# Patient Record
Sex: FEMALE | Race: OTHER | NOT HISPANIC OR LATINO
[De-identification: names, ages, dates, MRNs, and addresses within clinical notes are randomized per-mention and may not be internally consistent; named-entity substitution may affect disease eponyms.]

---

## 2018-11-26 ENCOUNTER — TRANSCRIPTION ENCOUNTER (OUTPATIENT)
Age: 23
End: 2018-11-26

## 2019-03-26 ENCOUNTER — EMERGENCY (EMERGENCY)
Facility: HOSPITAL | Age: 24
LOS: 1 days | Discharge: HOME | End: 2019-03-26
Attending: EMERGENCY MEDICINE
Payer: COMMERCIAL

## 2019-03-26 VITALS
RESPIRATION RATE: 20 BRPM | SYSTOLIC BLOOD PRESSURE: 131 MMHG | TEMPERATURE: 98 F | WEIGHT: 162.92 LBS | OXYGEN SATURATION: 98 % | HEART RATE: 87 BPM | DIASTOLIC BLOOD PRESSURE: 81 MMHG | HEIGHT: 66 IN

## 2019-03-26 LAB
ALBUMIN SERPL ELPH-MCNC: 4.2 G/DL — SIGNIFICANT CHANGE UP (ref 3.3–5)
ALP SERPL-CCNC: 54 U/L — SIGNIFICANT CHANGE UP (ref 40–120)
ALT FLD-CCNC: 14 U/L — SIGNIFICANT CHANGE UP (ref 10–45)
ANION GAP SERPL CALC-SCNC: 13 MMOL/L — SIGNIFICANT CHANGE UP (ref 5–17)
ANION GAP SERPL CALC-SCNC: 14 MMOL/L — SIGNIFICANT CHANGE UP (ref 5–17)
APTT BLD: 27.1 SEC — LOW (ref 27.5–36.3)
AST SERPL-CCNC: 27 U/L — SIGNIFICANT CHANGE UP (ref 10–40)
BASOPHILS # BLD AUTO: 0.1 K/UL — SIGNIFICANT CHANGE UP (ref 0–0.2)
BASOPHILS NFR BLD AUTO: 0.3 % — SIGNIFICANT CHANGE UP (ref 0–2)
BILIRUB SERPL-MCNC: 0.4 MG/DL — SIGNIFICANT CHANGE UP (ref 0.2–1.2)
BUN SERPL-MCNC: 10 MG/DL — SIGNIFICANT CHANGE UP (ref 7–23)
BUN SERPL-MCNC: 11 MG/DL — SIGNIFICANT CHANGE UP (ref 7–23)
CALCIUM SERPL-MCNC: 9.8 MG/DL — SIGNIFICANT CHANGE UP (ref 8.4–10.5)
CALCIUM SERPL-MCNC: 9.8 MG/DL — SIGNIFICANT CHANGE UP (ref 8.4–10.5)
CHLORIDE SERPL-SCNC: 105 MMOL/L — SIGNIFICANT CHANGE UP (ref 96–108)
CHLORIDE SERPL-SCNC: 107 MMOL/L — SIGNIFICANT CHANGE UP (ref 96–108)
CO2 SERPL-SCNC: 19 MMOL/L — LOW (ref 22–31)
CO2 SERPL-SCNC: 20 MMOL/L — LOW (ref 22–31)
CREAT SERPL-MCNC: 0.7 MG/DL — SIGNIFICANT CHANGE UP (ref 0.5–1.3)
CREAT SERPL-MCNC: 0.76 MG/DL — SIGNIFICANT CHANGE UP (ref 0.5–1.3)
EOSINOPHIL # BLD AUTO: 0.1 K/UL — SIGNIFICANT CHANGE UP (ref 0–0.5)
EOSINOPHIL NFR BLD AUTO: 0.4 % — SIGNIFICANT CHANGE UP (ref 0–6)
GLUCOSE SERPL-MCNC: 93 MG/DL — SIGNIFICANT CHANGE UP (ref 70–99)
GLUCOSE SERPL-MCNC: 95 MG/DL — SIGNIFICANT CHANGE UP (ref 70–99)
HCT VFR BLD CALC: 40.4 % — SIGNIFICANT CHANGE UP (ref 34.5–45)
HGB BLD-MCNC: 13.9 G/DL — SIGNIFICANT CHANGE UP (ref 11.5–15.5)
INR BLD: 0.97 RATIO — SIGNIFICANT CHANGE UP (ref 0.88–1.16)
LYMPHOCYTES # BLD AUTO: 12.2 % — LOW (ref 13–44)
LYMPHOCYTES # BLD AUTO: 2 K/UL — SIGNIFICANT CHANGE UP (ref 1–3.3)
MCHC RBC-ENTMCNC: 30.4 PG — SIGNIFICANT CHANGE UP (ref 27–34)
MCHC RBC-ENTMCNC: 34.3 GM/DL — SIGNIFICANT CHANGE UP (ref 32–36)
MCV RBC AUTO: 88.7 FL — SIGNIFICANT CHANGE UP (ref 80–100)
MONOCYTES # BLD AUTO: 1.2 K/UL — HIGH (ref 0–0.9)
MONOCYTES NFR BLD AUTO: 7.1 % — SIGNIFICANT CHANGE UP (ref 2–14)
NEUTROPHILS # BLD AUTO: 13 K/UL — HIGH (ref 1.8–7.4)
NEUTROPHILS NFR BLD AUTO: 80 % — HIGH (ref 43–77)
PLATELET # BLD AUTO: 324 K/UL — SIGNIFICANT CHANGE UP (ref 150–400)
POTASSIUM SERPL-MCNC: 4.5 MMOL/L — SIGNIFICANT CHANGE UP (ref 3.5–5.3)
POTASSIUM SERPL-MCNC: 5.6 MMOL/L — HIGH (ref 3.5–5.3)
POTASSIUM SERPL-SCNC: 4.5 MMOL/L — SIGNIFICANT CHANGE UP (ref 3.5–5.3)
POTASSIUM SERPL-SCNC: 5.6 MMOL/L — HIGH (ref 3.5–5.3)
PROT SERPL-MCNC: 7.7 G/DL — SIGNIFICANT CHANGE UP (ref 6–8.3)
PROTHROM AB SERPL-ACNC: 11.2 SEC — SIGNIFICANT CHANGE UP (ref 10–12.9)
RBC # BLD: 4.56 M/UL — SIGNIFICANT CHANGE UP (ref 3.8–5.2)
RBC # FLD: 10.8 % — SIGNIFICANT CHANGE UP (ref 10.3–14.5)
SODIUM SERPL-SCNC: 138 MMOL/L — SIGNIFICANT CHANGE UP (ref 135–145)
SODIUM SERPL-SCNC: 140 MMOL/L — SIGNIFICANT CHANGE UP (ref 135–145)
WBC # BLD: 16.2 K/UL — HIGH (ref 3.8–10.5)
WBC # FLD AUTO: 16.2 K/UL — HIGH (ref 3.8–10.5)

## 2019-03-26 PROCEDURE — 85379 FIBRIN DEGRADATION QUANT: CPT

## 2019-03-26 PROCEDURE — 80053 COMPREHEN METABOLIC PANEL: CPT

## 2019-03-26 PROCEDURE — 85610 PROTHROMBIN TIME: CPT

## 2019-03-26 PROCEDURE — 93971 EXTREMITY STUDY: CPT

## 2019-03-26 PROCEDURE — 85730 THROMBOPLASTIN TIME PARTIAL: CPT

## 2019-03-26 PROCEDURE — 71275 CT ANGIOGRAPHY CHEST: CPT | Mod: 26

## 2019-03-26 PROCEDURE — 71275 CT ANGIOGRAPHY CHEST: CPT

## 2019-03-26 PROCEDURE — 80048 BASIC METABOLIC PNL TOTAL CA: CPT

## 2019-03-26 PROCEDURE — 99284 EMERGENCY DEPT VISIT MOD MDM: CPT | Mod: 25

## 2019-03-26 PROCEDURE — 93005 ELECTROCARDIOGRAM TRACING: CPT | Mod: XU

## 2019-03-26 PROCEDURE — 93010 ELECTROCARDIOGRAM REPORT: CPT

## 2019-03-26 PROCEDURE — 85027 COMPLETE CBC AUTOMATED: CPT

## 2019-03-26 RX ORDER — ACETAMINOPHEN 500 MG
975 TABLET ORAL ONCE
Qty: 0 | Refills: 0 | Status: COMPLETED | OUTPATIENT
Start: 2019-03-26 | End: 2019-03-26

## 2019-03-26 RX ADMIN — Medication 975 MILLIGRAM(S): at 14:31

## 2019-03-26 RX ADMIN — Medication 975 MILLIGRAM(S): at 12:55

## 2019-03-26 NOTE — ED PROVIDER NOTE - NSFOLLOWUPCLINICS_GEN_ALL_ED_FT
[facility] - Primary Care  Primary Care  [address] [place] [address]  Phone: [phone]  Fax:   Follow Up Time:

## 2019-03-26 NOTE — ED PROVIDER NOTE - ATTENDING CONTRIBUTION TO CARE
23F, pmh periodic asthma, presents with sob, bilat leg craps x a few days. intermittent chest discomfort, none at this time. +mild sob. pt seen at urgent care yesterday, given nebulizer with some relief, started on prednisone, given dx of asthma exacerbation. today with continued sob, seen at pcp today, who sent a d-dimer which was positive, referred to ED. Denies f/c, n/v/d, abd pain, or other complaints.    PE: NAD, NCAT, MMM, Trachea midline, Normal conjunctiva, lungs CTAB, S1/S2 RRR, Normal perfusion, 2+ radial pulses bilat, Abdomen Soft, NTND, No rebound/guarding, No LE edema, No calf ttp, No deformity of extremities, No rashes,  No focal motor or sensory deficits.     23F with intermittent chest pain, sob. Most likely is 2/2 asthma exacerbation, however with +D-dimer must consider PE as well, although much lower suspicion. EKG without ischemic changes, very low suspicion acute cardiac etiology. Check CBC eval for anemia, cmp eval for metabolic derangement. Begin with LE duplex US's, send repeat d-dimer. When d-dimer results back will discuss risks/benefits of CTA to eval for PE. Give tylenol. Re-eval. - Glenn Arana MD

## 2019-03-26 NOTE — ED PROVIDER NOTE - NSFOLLOWUPINSTRUCTIONS_ED_ALL_ED_FT
1. You were seen for shortness of breath. A copy of your resulted labs, imaging, and findings have been provided to you.  2. Continue to take your home medications as prescribed.   3. Follow up with your primary care doctor within 48 hours. Please call [phone] to make an appointment or with any questions you may have.  4. Return immediately to the emergency department for new, persistent, or worsening symptoms or signs. Return immediately to the emergency department if you have chest pain, shortness of breath, loss of consciousness, fever, or coughing blood.

## 2019-03-26 NOTE — ED PROVIDER NOTE - CPE EDP PSYCH NORM
Anesthetic History   No history of anesthetic complications            Review of Systems / Medical History  Patient summary reviewed, nursing notes reviewed and pertinent labs reviewed    Pulmonary          Smoker (Quit 35 years)         Neuro/Psych   Within defined limits           Cardiovascular                  Exercise tolerance: >4 METS     GI/Hepatic/Renal     GERD: well controlled          Comments: Possible esoph stricture.  Endo/Other        Arthritis     Other Findings              Physical Exam    Airway  Mallampati: II  TM Distance: 4 - 6 cm  Neck ROM: normal range of motion   Mouth opening: Normal     Cardiovascular    Rhythm: irregular  Rate: abnormal         Dental    Dentition: Full lower dentures and Full upper dentures     Pulmonary  Breath sounds clear to auscultation               Abdominal         Other Findings            Anesthetic Plan    ASA: 2  Anesthesia type: total IV anesthesia          Induction: Intravenous  Anesthetic plan and risks discussed with: Patient normal...

## 2019-03-26 NOTE — ED ADULT NURSE NOTE - OBJECTIVE STATEMENT
23 y f came to the ed for sob. patient states she went to urgent care yesterday for sob and weakness in her legs. states a d-dimer was done and she received a phone call today saying it was elevated and she needed to go to the ed. patient states taking oral contraceptive. states the sob has been there for two weeks. denies any cough although c/o a "tickle" in her throat. denies any fevers, chills. c/o minor chest discomfort. skin is warm and dry.

## 2019-03-26 NOTE — ED PROCEDURE NOTE - PROCEDURE ADDITIONAL DETAILS
POCUS: Emergency Department Focused Ultrasound performed at patient's bedside.  The complete report may be available in PACS, see below for findings.   Patient/Family aware of the following: Have DVT study repeated in 5-7 days for completion of your evaluation, one may either go to a vascular lab arranged by a primary medical doctor or return to the emergency department if one is unable to obtain one.

## 2019-03-26 NOTE — ED PROVIDER NOTE - CLINICAL SUMMARY MEDICAL DECISION MAKING FREE TEXT BOX
22 y/o F w pmhx of asthma, on OCP's, fhx significant for mother on blood thinners and cousin with DVT, pw sob and chest pain, positive d dimer at pmd today, on exam vitals wnl, grossly unremarkable pe, plan: basic labs, ct a, b/l extremity duplex us pending to assess for dvt/pe, will reassess. Tylenol for treatment.

## 2019-03-26 NOTE — ED PROVIDER NOTE - PROGRESS NOTE DETAILS
d-dimer + in ED. duplex US bilat LE neg. discussed risks/benefits of cta chest with patient, she would like to have this performed. - Glenn Arana MD [place] PGY2: CTA shows no PE, b/l US shows no DVT on reassessment pt in NAD RRR CTAB will dc with PMD f/u

## 2019-03-26 NOTE — ED PROVIDER NOTE - OBJECTIVE STATEMENT
24 y/o F w pmhx of periodic asthma p/w SOB since yesterday w b/l cramps which radiate from thighs to lower legs. +intermittent CP,  none currently. +mild SOB currently. +fatigue and weakness.  Pt went to Urgent care yesterday for asthma attack, was given nebulizer (provided mild relief) and prednisone pills, was told they couldn't 'hear lungs well'. Pt went to PCP today, also noted decreased breath sounds, d dimer of 1.05, was advised to come into ED. Denies nvd, urinary sx, or other complaints. No recent travel. No recent URI sx. Pt on Junel BCP. Allergic to Reglan- fevers.  PCP: Kamilah Osborn 24 y/o F w pmhx of periodic asthma p/w SOB since yesterday w b/l cramps which radiate from thighs to lower legs. +intermittent chest discomfort and palpitations, (none currently) +mild SOB currently. +fatigue and weakness.  Pt went to Urgent care yesterday for asthma attack, was given nebulizer (provided mild relief) and prednisone pills, was told they couldn't 'hear lungs well'. Pt went to PCP today, also noted decreased breath sounds, d dimer of 1.05, was advised to come into ED. Pt's mother hx of anticoagulations (unknown why), cousin w hx of DVT. Denies nvd, urinary sx, calf tenderness or swelling, or other complaints. No recent travel. No recent URI sx. Pt on Junel BCP. Allergic to Reglan- fevers.  PCP: Kamilah Osborn

## 2019-03-26 NOTE — ED PROVIDER NOTE - EXTREMITY EXAM
5/5 strength in b/l lower extremities, hip flexors, knee flexors and extensors, plantar and dorsi flexors, hallux flexors and extensors, sensation intact to light touch b/l l3-s1, compartments soft, 2+ dp pulses b/l , no calf ttp or bl lower extremity  swelling

## 2019-05-06 ENCOUNTER — EMERGENCY (EMERGENCY)
Facility: HOSPITAL | Age: 24
LOS: 1 days | Discharge: HOME | End: 2019-05-06
Attending: EMERGENCY MEDICINE
Payer: COMMERCIAL

## 2019-05-06 VITALS
SYSTOLIC BLOOD PRESSURE: 118 MMHG | WEIGHT: 166.01 LBS | TEMPERATURE: 98 F | HEART RATE: 86 BPM | HEIGHT: 66 IN | OXYGEN SATURATION: 98 % | DIASTOLIC BLOOD PRESSURE: 82 MMHG | RESPIRATION RATE: 18 BRPM

## 2019-05-06 PROCEDURE — 99285 EMERGENCY DEPT VISIT HI MDM: CPT | Mod: 25

## 2019-05-07 VITALS
DIASTOLIC BLOOD PRESSURE: 63 MMHG | TEMPERATURE: 98 F | SYSTOLIC BLOOD PRESSURE: 117 MMHG | HEART RATE: 67 BPM | RESPIRATION RATE: 17 BRPM | OXYGEN SATURATION: 100 %

## 2019-05-07 LAB
ALBUMIN SERPL ELPH-MCNC: 4.1 G/DL — SIGNIFICANT CHANGE UP (ref 3.3–5)
ALP SERPL-CCNC: 58 U/L — SIGNIFICANT CHANGE UP (ref 40–120)
ALT FLD-CCNC: 122 U/L — HIGH (ref 10–45)
ANION GAP SERPL CALC-SCNC: 11 MMOL/L — SIGNIFICANT CHANGE UP (ref 5–17)
APPEARANCE UR: CLEAR — SIGNIFICANT CHANGE UP
AST SERPL-CCNC: 104 U/L — HIGH (ref 10–40)
BACTERIA # UR AUTO: NEGATIVE — SIGNIFICANT CHANGE UP
BASOPHILS # BLD AUTO: 0.1 K/UL — SIGNIFICANT CHANGE UP (ref 0–0.2)
BASOPHILS NFR BLD AUTO: 1.5 % — SIGNIFICANT CHANGE UP (ref 0–2)
BILIRUB SERPL-MCNC: 0.3 MG/DL — SIGNIFICANT CHANGE UP (ref 0.2–1.2)
BILIRUB UR-MCNC: NEGATIVE — SIGNIFICANT CHANGE UP
BUN SERPL-MCNC: 16 MG/DL — SIGNIFICANT CHANGE UP (ref 7–23)
CALCIUM SERPL-MCNC: 9.7 MG/DL — SIGNIFICANT CHANGE UP (ref 8.4–10.5)
CHLORIDE SERPL-SCNC: 102 MMOL/L — SIGNIFICANT CHANGE UP (ref 96–108)
CO2 SERPL-SCNC: 25 MMOL/L — SIGNIFICANT CHANGE UP (ref 22–31)
COLOR SPEC: SIGNIFICANT CHANGE UP
CREAT SERPL-MCNC: 0.75 MG/DL — SIGNIFICANT CHANGE UP (ref 0.5–1.3)
DIFF PNL FLD: NEGATIVE — SIGNIFICANT CHANGE UP
EOSINOPHIL # BLD AUTO: 0.3 K/UL — SIGNIFICANT CHANGE UP (ref 0–0.5)
EOSINOPHIL NFR BLD AUTO: 4 % — SIGNIFICANT CHANGE UP (ref 0–6)
EPI CELLS # UR: 2 /HPF — SIGNIFICANT CHANGE UP
GAS PNL BLDV: SIGNIFICANT CHANGE UP
GLUCOSE SERPL-MCNC: 97 MG/DL — SIGNIFICANT CHANGE UP (ref 70–99)
GLUCOSE UR QL: NEGATIVE — SIGNIFICANT CHANGE UP
HCT VFR BLD CALC: 41.1 % — SIGNIFICANT CHANGE UP (ref 34.5–45)
HGB BLD-MCNC: 13.9 G/DL — SIGNIFICANT CHANGE UP (ref 11.5–15.5)
HYALINE CASTS # UR AUTO: 0 /LPF — SIGNIFICANT CHANGE UP (ref 0–2)
KETONES UR-MCNC: NEGATIVE — SIGNIFICANT CHANGE UP
LEUKOCYTE ESTERASE UR-ACNC: NEGATIVE — SIGNIFICANT CHANGE UP
LIDOCAIN IGE QN: 28 U/L — SIGNIFICANT CHANGE UP (ref 7–60)
LYMPHOCYTES # BLD AUTO: 3.9 K/UL — HIGH (ref 1–3.3)
LYMPHOCYTES # BLD AUTO: 46.1 % — HIGH (ref 13–44)
MCHC RBC-ENTMCNC: 30.5 PG — SIGNIFICANT CHANGE UP (ref 27–34)
MCHC RBC-ENTMCNC: 34 GM/DL — SIGNIFICANT CHANGE UP (ref 32–36)
MCV RBC AUTO: 89.8 FL — SIGNIFICANT CHANGE UP (ref 80–100)
MONOCYTES # BLD AUTO: 0.6 K/UL — SIGNIFICANT CHANGE UP (ref 0–0.9)
MONOCYTES NFR BLD AUTO: 7.4 % — SIGNIFICANT CHANGE UP (ref 2–14)
NEUTROPHILS # BLD AUTO: 3.4 K/UL — SIGNIFICANT CHANGE UP (ref 1.8–7.4)
NEUTROPHILS NFR BLD AUTO: 41 % — LOW (ref 43–77)
NITRITE UR-MCNC: NEGATIVE — SIGNIFICANT CHANGE UP
PH UR: 6.5 — SIGNIFICANT CHANGE UP (ref 5–8)
PLATELET # BLD AUTO: 387 K/UL — SIGNIFICANT CHANGE UP (ref 150–400)
POTASSIUM SERPL-MCNC: 4.3 MMOL/L — SIGNIFICANT CHANGE UP (ref 3.5–5.3)
POTASSIUM SERPL-SCNC: 4.3 MMOL/L — SIGNIFICANT CHANGE UP (ref 3.5–5.3)
PROT SERPL-MCNC: 7 G/DL — SIGNIFICANT CHANGE UP (ref 6–8.3)
PROT UR-MCNC: NEGATIVE — SIGNIFICANT CHANGE UP
RBC # BLD: 4.57 M/UL — SIGNIFICANT CHANGE UP (ref 3.8–5.2)
RBC # FLD: 10.5 % — SIGNIFICANT CHANGE UP (ref 10.3–14.5)
RBC CASTS # UR COMP ASSIST: 2 /HPF — SIGNIFICANT CHANGE UP (ref 0–4)
SODIUM SERPL-SCNC: 138 MMOL/L — SIGNIFICANT CHANGE UP (ref 135–145)
SP GR SPEC: 1.02 — SIGNIFICANT CHANGE UP (ref 1.01–1.02)
UROBILINOGEN FLD QL: NEGATIVE — SIGNIFICANT CHANGE UP
WBC # BLD: 8.4 K/UL — SIGNIFICANT CHANGE UP (ref 3.8–10.5)
WBC # FLD AUTO: 8.4 K/UL — SIGNIFICANT CHANGE UP (ref 3.8–10.5)
WBC UR QL: 2 /HPF — SIGNIFICANT CHANGE UP (ref 0–5)

## 2019-05-07 PROCEDURE — 96361 HYDRATE IV INFUSION ADD-ON: CPT

## 2019-05-07 PROCEDURE — 99284 EMERGENCY DEPT VISIT MOD MDM: CPT | Mod: 25

## 2019-05-07 PROCEDURE — 82435 ASSAY OF BLOOD CHLORIDE: CPT

## 2019-05-07 PROCEDURE — 74177 CT ABD & PELVIS W/CONTRAST: CPT | Mod: 26

## 2019-05-07 PROCEDURE — 96375 TX/PRO/DX INJ NEW DRUG ADDON: CPT

## 2019-05-07 PROCEDURE — 82803 BLOOD GASES ANY COMBINATION: CPT

## 2019-05-07 PROCEDURE — 85014 HEMATOCRIT: CPT

## 2019-05-07 PROCEDURE — 82330 ASSAY OF CALCIUM: CPT

## 2019-05-07 PROCEDURE — 81001 URINALYSIS AUTO W/SCOPE: CPT

## 2019-05-07 PROCEDURE — 74177 CT ABD & PELVIS W/CONTRAST: CPT

## 2019-05-07 PROCEDURE — 76705 ECHO EXAM OF ABDOMEN: CPT

## 2019-05-07 PROCEDURE — 83690 ASSAY OF LIPASE: CPT

## 2019-05-07 PROCEDURE — 85027 COMPLETE CBC AUTOMATED: CPT

## 2019-05-07 PROCEDURE — 80053 COMPREHEN METABOLIC PANEL: CPT

## 2019-05-07 PROCEDURE — 83605 ASSAY OF LACTIC ACID: CPT

## 2019-05-07 PROCEDURE — 84295 ASSAY OF SERUM SODIUM: CPT

## 2019-05-07 PROCEDURE — 84132 ASSAY OF SERUM POTASSIUM: CPT

## 2019-05-07 PROCEDURE — 82947 ASSAY GLUCOSE BLOOD QUANT: CPT

## 2019-05-07 PROCEDURE — 96374 THER/PROPH/DIAG INJ IV PUSH: CPT | Mod: XU

## 2019-05-07 RX ORDER — ACETAMINOPHEN 500 MG
650 TABLET ORAL ONCE
Qty: 0 | Refills: 0 | Status: COMPLETED | OUTPATIENT
Start: 2019-05-07 | End: 2019-05-07

## 2019-05-07 RX ORDER — SODIUM CHLORIDE 9 MG/ML
1000 INJECTION INTRAMUSCULAR; INTRAVENOUS; SUBCUTANEOUS ONCE
Qty: 0 | Refills: 0 | Status: COMPLETED | OUTPATIENT
Start: 2019-05-07 | End: 2019-05-07

## 2019-05-07 RX ORDER — SUCRALFATE 1 G
1 TABLET ORAL ONCE
Qty: 0 | Refills: 0 | Status: COMPLETED | OUTPATIENT
Start: 2019-05-07 | End: 2019-05-07

## 2019-05-07 RX ORDER — MORPHINE SULFATE 50 MG/1
2 CAPSULE, EXTENDED RELEASE ORAL ONCE
Qty: 0 | Refills: 0 | Status: DISCONTINUED | OUTPATIENT
Start: 2019-05-07 | End: 2019-05-07

## 2019-05-07 RX ORDER — ONDANSETRON 8 MG/1
4 TABLET, FILM COATED ORAL ONCE
Qty: 0 | Refills: 0 | Status: COMPLETED | OUTPATIENT
Start: 2019-05-07 | End: 2019-05-07

## 2019-05-07 RX ORDER — FAMOTIDINE 10 MG/ML
20 INJECTION INTRAVENOUS ONCE
Qty: 0 | Refills: 0 | Status: COMPLETED | OUTPATIENT
Start: 2019-05-07 | End: 2019-05-07

## 2019-05-07 RX ADMIN — Medication 650 MILLIGRAM(S): at 00:34

## 2019-05-07 RX ADMIN — Medication 30 MILLILITER(S): at 00:35

## 2019-05-07 RX ADMIN — Medication 1 GRAM(S): at 03:20

## 2019-05-07 RX ADMIN — MORPHINE SULFATE 2 MILLIGRAM(S): 50 CAPSULE, EXTENDED RELEASE ORAL at 02:36

## 2019-05-07 RX ADMIN — SODIUM CHLORIDE 1000 MILLILITER(S): 9 INJECTION INTRAMUSCULAR; INTRAVENOUS; SUBCUTANEOUS at 03:10

## 2019-05-07 RX ADMIN — Medication 650 MILLIGRAM(S): at 02:27

## 2019-05-07 RX ADMIN — ONDANSETRON 4 MILLIGRAM(S): 8 TABLET, FILM COATED ORAL at 02:35

## 2019-05-07 RX ADMIN — SODIUM CHLORIDE 1000 MILLILITER(S): 9 INJECTION INTRAMUSCULAR; INTRAVENOUS; SUBCUTANEOUS at 02:32

## 2019-05-07 RX ADMIN — FAMOTIDINE 20 MILLIGRAM(S): 10 INJECTION INTRAVENOUS at 00:47

## 2019-05-07 RX ADMIN — MORPHINE SULFATE 2 MILLIGRAM(S): 50 CAPSULE, EXTENDED RELEASE ORAL at 03:15

## 2019-05-07 NOTE — ED ADULT NURSE NOTE - OBJECTIVE STATEMENT
pt reports cramping across abdomen radiating to flanks bilaterally; reports worse after eating; has occurred intermittently over last weeks but is worse today; reports diarrhea x 1 today; pt is moaning and sitting in fetal position on stretcher

## 2019-05-07 NOTE — ED PROVIDER NOTE - PROGRESS NOTE DETAILS
Rodrigo Davies MD, PGY3: 23F recurrent abdominal pain and bloating monthly, no CT or workup in the past. Diarrhea, generalized abd pain usually postprandial. TTP RUQ, no rebound. US RUQ, if negative, CT a/p. Labs, GI meds. Pregnancy profile Attending note (Levar): US shows normal gallbladder.  Had some initial improvement in pain, now worsening, and dry heaving / nausea; will give morphine 2mg and zofran 4mg iv.  Plan now for CT for further evaluation of abdominal pain. Pt feels better. Tolerating PO. CT negative for acute pathology. Patient informed of ED visit findings, understands plan.  Patient provided with written and further verbal instructions not included in discharge paperwork.  Patient instructed to follow up with their primary care physician in 2-3 days and return for new, worsened, or persistent symptoms. Will provide GI follow up. Pt feels better. Tolerating PO. CT negative for acute pathology. Patient informed of ED visit findings, understands plan.  Patient provided with written and further verbal instructions not included in discharge paperwork.  Patient instructed to follow up with their primary care physician in 2-3 days and return for new, worsened, or persistent symptoms. Will provide GI follow up. Discussed hepatic lesion and outpatient follow up

## 2019-05-07 NOTE — ED PROVIDER NOTE - CLINICAL SUMMARY MEDICAL DECISION MAKING FREE TEXT BOX
23F p/w abd pain, nausea; tender somewhat diffusely in abdomen though more tender in RUQ; will obtain labs, cbc cmp lipase hcg ua and ultrasound; if normal US and no significant findings, consider CT.

## 2019-05-07 NOTE — ED PROVIDER NOTE - NSFOLLOWUPCLINICS_GEN_ALL_ED_FT
[facility] Gastroenterology  Gastroenterology  [address] [place] [id number]  Phone: [phone]  Fax:   Follow Up Time:

## 2019-05-07 NOTE — ED PROVIDER NOTE - NSFOLLOWUPINSTRUCTIONS_ED_ALL_ED_FT
Please follow up with your Primary MD in 24-48 hr.  Seek immediate medical care for any new/worsening signs or symptoms.  Take pepcid 20mg once a day for recurring pain.  Follow up with gastroenterology for an appointment.

## 2019-05-07 NOTE — ED PROVIDER NOTE - OBJECTIVE STATEMENT
Attending note (Levar): 24y/o F h/o asthma, upper abdominal cramping since this afternoon; states whenever she eats she has pain with bloating, gassy feeling and tastes acid feeling.  Has had similar pain before, worse after eating.  +nausea, no vomiting  Pain in mid upper abdomen and radiating around to back (around sides of abdomen)   1 episode loose stools today.    Nonsmoker; +social drinking (none in last 24 h), denies substance abuse (denies marijuana use)  +diarrhea  no fever/chills.  no recent travel    LMP last month, no dysuria, no vaginal discharge  PMH: h/o asthma  meds: inhaler prn (albuterol)  allergy to reglan (fever and high blood pressure?) Attending note (Levar): 22y/o F h/o asthma, upper abdominal cramping since this afternoon; states whenever she eats she has pain with bloating, gassy feeling and tastes acid feeling.  Has had similar pain before, worse after eating.  +nausea, no vomiting  Pain in mid upper abdomen and radiating around to back (around sides of abdomen)   1 episode loose stools today.    Nonsmoker; +social drinking (none in last 24 h), denies substance abuse (denies marijuana use)  +diarrhea  no fever/chills.  no recent travel    LMP last month, no dysuria, no vaginal discharge   PMH: h/o asthma  meds: inhaler prn (albuterol); was on prednisone 1 month ago for exacerbation; no h/o intubations or hospitalizations for asthma.  allergy to reglan (rxn: fever and high blood pressure?)

## 2019-05-07 NOTE — ED PROVIDER NOTE - ATTENDING CONTRIBUTION TO CARE
22y/o F h/o asthma, upper abdominal cramping since this afternoon; states whenever she eats she has pain with bloating, gassy feeling and tastes acid feeling.     On Physical Exam:  General: well appearing, in NAD, speaking clearly in full sentences and without difficulty; cooperative with exam  HEENT: PERRL, MMM  Neck: no neck tenderness, no nuchal rigidity  Cardiac: normal s1, s2; RRR; no MGR  Lungs: CTABL  Abdomen: diffuse upper abdominal tenderness, most in RUQ, no rebound or guarding; neg walsh's  : no bladder tenderness or distension  Skin: intact, no rash  Extremities: no peripheral edema, no gross deformities  Neuro: no gross neurologic deficits     AP: Upper abd pain a/w meals; on exam ttp in RUQ, mildly tender throughout upper abdomen; likely gastritis/GERD/PUD vs biliary colic / cholecystitis / choledocholithiasis, less likely pancreatitis, much less likely pelvic organ related (no lower abdominal tenderness); will obtain labs (cbc, cmp, lipase, ua, hcg) and RUQ US.  If RUQ ultrasound normal and still symptomatic after initial meds (maalox/pepcid/tylenol) consider CT for further evaluation (with diarrhea also consider colitis in differential).

## 2019-05-07 NOTE — ED ADULT NURSE NOTE - NSIMPLEMENTINTERV_GEN_ALL_ED
Implemented All Universal Safety Interventions:  [place] to call system. Call bell, personal items and telephone within reach. Instruct patient to call for assistance. Room bathroom lighting operational. Non-slip footwear when patient is off stretcher. Physically safe environment: no spills, clutter or unnecessary equipment. Stretcher in lowest position, wheels locked, appropriate side rails in place.

## 2019-05-07 NOTE — ED PROVIDER NOTE - CONSTITUTIONAL, MLM
normal... Well appearing, well nourished, awake, alert, oriented to person, place, time/situation and appears uncomfortable due to pain but is speaking in full sentences and cooperative with exam.

## 2019-09-17 ENCOUNTER — OUTPATIENT (OUTPATIENT)
Dept: OUTPATIENT SERVICES | Facility: HOSPITAL | Age: 24
LOS: 1 days | End: 2019-09-17
Payer: COMMERCIAL

## 2019-09-17 DIAGNOSIS — K76.89 OTHER SPECIFIED DISEASES OF LIVER: ICD-10-CM

## 2019-09-17 PROCEDURE — 76700 US EXAM ABDOM COMPLETE: CPT

## 2019-09-17 PROCEDURE — 76700 US EXAM ABDOM COMPLETE: CPT | Mod: 26

## 2019-10-03 ENCOUNTER — APPOINTMENT (OUTPATIENT)
Dept: ORTHOPEDIC SURGERY | Facility: CLINIC | Age: 24
End: 2019-10-03
Payer: COMMERCIAL

## 2019-10-03 VITALS
DIASTOLIC BLOOD PRESSURE: 72 MMHG | HEIGHT: 66 IN | SYSTOLIC BLOOD PRESSURE: 105 MMHG | HEART RATE: 64 BPM | BODY MASS INDEX: 27.48 KG/M2 | WEIGHT: 171 LBS

## 2019-10-03 DIAGNOSIS — Z78.9 OTHER SPECIFIED HEALTH STATUS: ICD-10-CM

## 2019-10-03 DIAGNOSIS — M23.8X1 OTHER INTERNAL DERANGEMENTS OF RIGHT KNEE: ICD-10-CM

## 2019-10-03 DIAGNOSIS — Z87.09 PERSONAL HISTORY OF OTHER DISEASES OF THE RESPIRATORY SYSTEM: ICD-10-CM

## 2019-10-03 PROBLEM — Z00.00 ENCOUNTER FOR PREVENTIVE HEALTH EXAMINATION: Status: ACTIVE | Noted: 2019-10-03

## 2019-10-03 PROCEDURE — 99203 OFFICE O/P NEW LOW 30 MIN: CPT

## 2019-10-03 PROCEDURE — 73562 X-RAY EXAM OF KNEE 3: CPT | Mod: RT

## 2019-10-04 PROBLEM — M23.8X1 OTHER INTERNAL DERANGEMENTS OF RIGHT KNEE: Status: ACTIVE | Noted: 2019-10-03

## 2019-11-17 ENCOUNTER — EMERGENCY (EMERGENCY)
Facility: HOSPITAL | Age: 24
LOS: 1 days | Discharge: HOME | End: 2019-11-17
Attending: EMERGENCY MEDICINE
Payer: COMMERCIAL

## 2019-11-17 VITALS
HEIGHT: 66 IN | RESPIRATION RATE: 18 BRPM | TEMPERATURE: 99 F | WEIGHT: 173.94 LBS | OXYGEN SATURATION: 100 % | SYSTOLIC BLOOD PRESSURE: 112 MMHG | DIASTOLIC BLOOD PRESSURE: 79 MMHG | HEART RATE: 95 BPM

## 2019-11-17 PROCEDURE — 99283 EMERGENCY DEPT VISIT LOW MDM: CPT

## 2019-11-17 PROCEDURE — 99284 EMERGENCY DEPT VISIT MOD MDM: CPT

## 2019-11-17 RX ORDER — IBUPROFEN 200 MG
600 TABLET ORAL ONCE
Refills: 0 | Status: COMPLETED | OUTPATIENT
Start: 2019-11-17 | End: 2019-11-17

## 2019-11-17 RX ORDER — IBUPROFEN 200 MG
1 TABLET ORAL
Qty: 40 | Refills: 0
Start: 2019-11-17 | End: 2019-11-26

## 2019-11-17 RX ORDER — FLUTICASONE PROPIONATE 50 MCG
1 SPRAY, SUSPENSION NASAL
Qty: 1 | Refills: 0
Start: 2019-11-17 | End: 2019-12-16

## 2019-11-17 RX ADMIN — Medication 600 MILLIGRAM(S): at 09:30

## 2019-11-17 NOTE — ED STATDOCS - PHYSICAL EXAMINATION
Const: Awake, alert and oriented. In no acute distress. Well appearing.  HEENT: NC/AT. + frontal and maxillary sinus TTP. Clear rhinorrhea. Moist mucous membranes. Posterior oropharynx mildly erythematous, no tonsillar exudate, uvula midline.  Eyes: No scleral icterus. EOMI.  Neck:. Soft and supple. Full ROM without pain.  Cardiac: Regular rate and regular rhythm. +S1/S2. No murmurs. Peripheral pulses 2+ and symmetric. No LE edema.  Resp: Speaking in full sentences. No evidence of respiratory distress. No wheezes, rales or rhonchi.  Abd: Soft, non-tender, non-distended. Normal bowel sounds in all 4 quadrants. No guarding or rebound.  Back: Spine midline and non-tender. No CVAT.  Skin: No rashes, abrasions or lacerations.  Lymph: No cervical lymphadenopathy.  Neuro: Awake, alert & oriented x 3. Moves all extremities symmetrically.

## 2019-11-17 NOTE — ED STATDOCS - PATIENT PORTAL LINK FT
You can access the FollowMyHealth Patient Portal offered by [facility] by registering at the following website: [email or web address]. By joining [employer]’s FollowMyHealth portal, you will also be able to view your health information using other applications (apps) compatible with our system.

## 2019-11-17 NOTE — ED STATDOCS - OBJECTIVE STATEMENT
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 She has not taken any medication for her symptoms. She notes mild sore throat, mild coughing, denies nausea or vomiting, abdominal pain, diarrhea. She denies chest tightness or wheezing. LMP was 10/31/19.

## 2019-11-17 NOTE — ED STATDOCS - NS ED ROS FT
Const: + fever, + body aches, + chills.  HEENT: + sore throat, + sinus pressure, + runny nose. Denies blurry vision  Neck: Denies neck pain/stiffness  Resp: + coughing. Denies SOB  Cardiovascular: Denies CP, palpitations, LE edema  GI: Denies nausea, vomiting, abdominal pain, diarrhea, constipation, blood in stool  : Denies urinary frequency/urgency/dysuria, hematuria  MSK: Denies back pain  Neuro: Denies HA, dizziness, numbness, weakness  Skin: Denies rashes.

## 2019-11-17 NOTE — ED ADULT NURSE NOTE - OBJECTIVE STATEMENT
25 y/o female c/o runny nose and nose aches since yesterday. Pt aox4, resp even unlabored, no further complaints

## 2019-11-17 NOTE — ED STATDOCS - CLINICAL SUMMARY MEDICAL DECISION MAKING FREE TEXT BOX
23 y/o with PMH asthma presents with low grade subjective fever, sinus pressure, sore throat and generalized body aches for the past 2 days. Temp 99.3 here. Sinus TTP, clear rhinorrhea, lungs clear, throat mildly erythematous. Will give ibuprofen here (patient refuses prednisone at this time, will send an Rx) and prescribe flonase, ibuprofen and prednisone, advised patient to rest, drink plenty of fluids and follow up with PMD. She verbalizes understanding and is comfortable with discharge at this time.

## 2020-02-13 ENCOUNTER — EMERGENCY (EMERGENCY)
Facility: HOSPITAL | Age: 25
LOS: 1 days | Discharge: HOME | End: 2020-02-13
Attending: STUDENT IN AN ORGANIZED HEALTH CARE EDUCATION/TRAINING PROGRAM
Payer: COMMERCIAL

## 2020-02-13 VITALS
OXYGEN SATURATION: 99 % | TEMPERATURE: 98 F | HEART RATE: 90 BPM | DIASTOLIC BLOOD PRESSURE: 70 MMHG | SYSTOLIC BLOOD PRESSURE: 130 MMHG | RESPIRATION RATE: 16 BRPM

## 2020-02-13 PROBLEM — J45.20 MILD INTERMITTENT ASTHMA, UNCOMPLICATED: Chronic | Status: ACTIVE | Noted: 2019-11-17

## 2020-02-13 LAB
ALBUMIN SERPL ELPH-MCNC: 4.3 G/DL — SIGNIFICANT CHANGE UP (ref 3.3–5.2)
ALP SERPL-CCNC: 67 U/L — SIGNIFICANT CHANGE UP (ref 40–120)
ALT FLD-CCNC: 12 U/L — SIGNIFICANT CHANGE UP
ANION GAP SERPL CALC-SCNC: 16 MMOL/L — SIGNIFICANT CHANGE UP (ref 5–17)
AST SERPL-CCNC: 21 U/L — SIGNIFICANT CHANGE UP
BASOPHILS # BLD AUTO: 0.09 K/UL — SIGNIFICANT CHANGE UP (ref 0–0.2)
BASOPHILS NFR BLD AUTO: 1.4 % — SIGNIFICANT CHANGE UP (ref 0–2)
BILIRUB SERPL-MCNC: 0.6 MG/DL — SIGNIFICANT CHANGE UP (ref 0.4–2)
BUN SERPL-MCNC: 11 MG/DL — SIGNIFICANT CHANGE UP (ref 8–20)
CALCIUM SERPL-MCNC: 9.4 MG/DL — SIGNIFICANT CHANGE UP (ref 8.6–10.2)
CHLORIDE SERPL-SCNC: 98 MMOL/L — SIGNIFICANT CHANGE UP (ref 98–107)
CO2 SERPL-SCNC: 23 MMOL/L — SIGNIFICANT CHANGE UP (ref 22–29)
CREAT SERPL-MCNC: 0.63 MG/DL — SIGNIFICANT CHANGE UP (ref 0.5–1.3)
EOSINOPHIL # BLD AUTO: 0.16 K/UL — SIGNIFICANT CHANGE UP (ref 0–0.5)
EOSINOPHIL NFR BLD AUTO: 2.5 % — SIGNIFICANT CHANGE UP (ref 0–6)
GLUCOSE SERPL-MCNC: 104 MG/DL — HIGH (ref 70–99)
HCG SERPL-ACNC: <4 MIU/ML — SIGNIFICANT CHANGE UP
HCT VFR BLD CALC: 40.6 % — SIGNIFICANT CHANGE UP (ref 34.5–45)
HGB BLD-MCNC: 12.8 G/DL — SIGNIFICANT CHANGE UP (ref 11.5–15.5)
IMM GRANULOCYTES NFR BLD AUTO: 0.2 % — SIGNIFICANT CHANGE UP (ref 0–1.5)
LYMPHOCYTES # BLD AUTO: 2.89 K/UL — SIGNIFICANT CHANGE UP (ref 1–3.3)
LYMPHOCYTES # BLD AUTO: 45.5 % — HIGH (ref 13–44)
MCHC RBC-ENTMCNC: 29.2 PG — SIGNIFICANT CHANGE UP (ref 27–34)
MCHC RBC-ENTMCNC: 31.5 GM/DL — LOW (ref 32–36)
MCV RBC AUTO: 92.7 FL — SIGNIFICANT CHANGE UP (ref 80–100)
MONOCYTES # BLD AUTO: 0.62 K/UL — SIGNIFICANT CHANGE UP (ref 0–0.9)
MONOCYTES NFR BLD AUTO: 9.8 % — SIGNIFICANT CHANGE UP (ref 2–14)
NEUTROPHILS # BLD AUTO: 2.58 K/UL — SIGNIFICANT CHANGE UP (ref 1.8–7.4)
NEUTROPHILS NFR BLD AUTO: 40.6 % — LOW (ref 43–77)
PLATELET # BLD AUTO: 349 K/UL — SIGNIFICANT CHANGE UP (ref 150–400)
POTASSIUM SERPL-MCNC: 4.2 MMOL/L — SIGNIFICANT CHANGE UP (ref 3.5–5.3)
POTASSIUM SERPL-SCNC: 4.2 MMOL/L — SIGNIFICANT CHANGE UP (ref 3.5–5.3)
PROT SERPL-MCNC: 7.5 G/DL — SIGNIFICANT CHANGE UP (ref 6.6–8.7)
RBC # BLD: 4.38 M/UL — SIGNIFICANT CHANGE UP (ref 3.8–5.2)
RBC # FLD: 11.5 % — SIGNIFICANT CHANGE UP (ref 10.3–14.5)
SODIUM SERPL-SCNC: 137 MMOL/L — SIGNIFICANT CHANGE UP (ref 135–145)
WBC # BLD: 6.35 K/UL — SIGNIFICANT CHANGE UP (ref 3.8–10.5)
WBC # FLD AUTO: 6.35 K/UL — SIGNIFICANT CHANGE UP (ref 3.8–10.5)

## 2020-02-13 PROCEDURE — 82962 GLUCOSE BLOOD TEST: CPT

## 2020-02-13 PROCEDURE — 99284 EMERGENCY DEPT VISIT MOD MDM: CPT | Mod: 25

## 2020-02-13 PROCEDURE — 80053 COMPREHEN METABOLIC PANEL: CPT

## 2020-02-13 PROCEDURE — 70450 CT HEAD/BRAIN W/O DYE: CPT | Mod: 26

## 2020-02-13 PROCEDURE — 84702 CHORIONIC GONADOTROPIN TEST: CPT

## 2020-02-13 PROCEDURE — 36415 COLL VENOUS BLD VENIPUNCTURE: CPT

## 2020-02-13 PROCEDURE — 85027 COMPLETE CBC AUTOMATED: CPT

## 2020-02-13 PROCEDURE — 93010 ELECTROCARDIOGRAM REPORT: CPT

## 2020-02-13 PROCEDURE — 70450 CT HEAD/BRAIN W/O DYE: CPT

## 2020-02-13 PROCEDURE — 99285 EMERGENCY DEPT VISIT HI MDM: CPT

## 2020-02-13 PROCEDURE — 93005 ELECTROCARDIOGRAM TRACING: CPT

## 2020-02-13 RX ORDER — SODIUM CHLORIDE 9 MG/ML
1000 INJECTION INTRAMUSCULAR; INTRAVENOUS; SUBCUTANEOUS ONCE
Refills: 0 | Status: COMPLETED | OUTPATIENT
Start: 2020-02-13 | End: 2020-02-13

## 2020-02-13 RX ADMIN — SODIUM CHLORIDE 1000 MILLILITER(S): 9 INJECTION INTRAMUSCULAR; INTRAVENOUS; SUBCUTANEOUS at 15:45

## 2020-02-13 NOTE — ED PROVIDER NOTE - OBJECTIVE STATEMENT
24F h/o asthma p/w syncope. pt is an employee here, was at ultrasound bringing a patient, was washing her hands when she suddenly felt lightheaded and syncopized hitting her head on the ground 24F h/o asthma p/w syncope. pt is an employee here, was at ultrasound bringing a patient, was washing her hands when she suddenly felt lightheaded and syncopized hitting her head on the ground. rapid called, upon arrival pt was awake, laying on the ground. still complaining of some lightheadedness. denies cp/sob/palpitations, f/c, abd pain. felt well prior to washing her hands. currently on her menses. denies HA, visual changes, mental status changes, numb/tingling/weakness of extremities.

## 2020-02-13 NOTE — ED PROVIDER NOTE - ATTENDING CONTRIBUTION TO CARE
I performed a face to face history and physical exam of the patient and discussed their management with the resident/ACP. I reviewed the resident/ACP's note and agree with the documented findings and plan of care.    Pt states that she was at work when she was washing her hands and she suddenly felt lightheaded and passed out. Pt states that she hit her head.  Pt states that she feels a little lightheaded right now.    physical - rrr. ctab. abd - soft, nt. no edema. no rash.    plan - Pt with syncopal episode.  will check labs, ct, give ivf, ekg and reassess.

## 2020-02-13 NOTE — ED PROVIDER NOTE - PHYSICAL EXAMINATION
Vitals: WNL  Gen: laying comfortably in NAD  Head: NCAT  ENT: sclerae white, anicterus, moist mucous membranes. No exudates. no cspine midlline tenderness, full cspine rom, perrla  CV: RRR. Audible S1 and S2. No murmurs, rubs, gallops, S3, nor S4, 2+ radial and DP pulses   Pulm: Clear to auscultation bilaterally. No wheezes, rales, or rhonchi  Abd: soft, normoactive BS x4, NTND, no rebound, no guarding, no rashes  Musculoskeletal: pelvis stable  Skin: no lesions or scars noted  Neurologic: AAOx3, CN2-12 intact, motor/sensation of extremities grossly intact  : no CVA tenderness  Psych: no SI/HI

## 2020-02-13 NOTE — ED PROVIDER NOTE - CLINICAL SUMMARY MEDICAL DECISION MAKING FREE TEXT BOX
24F h/o asthma p/w syncope. pt with improving lightheadedness here. low supsicion for anemia as cause of syncope given pt not hypotensive or tachy here and without increased vaginal bleeding. will check for arrhythmias, cardiac cause of syncope. will obtain ekg, cth given she hit her head upon syncopal episode. will also r/o dehydration, orthostatics

## 2020-02-13 NOTE — ED PROVIDER NOTE - PATIENT PORTAL LINK FT
You can access the FollowMyHealth Patient Portal offered by [facility] by registering at the following website: [email or web address]. By joining [employer]s FollowMyHealth portal, you will also be able to view your health information using other applications (apps) compatible with our system.

## 2020-02-13 NOTE — ED ADULT NURSE NOTE - OBJECTIVE STATEMENT
pt comes to ED as rapid response, passed out while working. witnesses report pt was washing her hands and she fell. pt awake, drowsy, no seizure like activity on exam. pt with dizziness, denies nausea. denies chance of pregnancy. LMP 1 month ago, normal for her. no chest pain no SOB. even and unlabored resps present. skin warm dry and intact.

## 2020-02-19 ENCOUNTER — APPOINTMENT (OUTPATIENT)
Dept: CARDIOLOGY | Facility: CLINIC | Age: 25
End: 2020-02-19
Payer: COMMERCIAL

## 2020-02-19 VITALS
HEART RATE: 76 BPM | DIASTOLIC BLOOD PRESSURE: 76 MMHG | SYSTOLIC BLOOD PRESSURE: 110 MMHG | OXYGEN SATURATION: 99 % | BODY MASS INDEX: 28.77 KG/M2 | WEIGHT: 179 LBS | HEIGHT: 66 IN

## 2020-02-19 VITALS — DIASTOLIC BLOOD PRESSURE: 70 MMHG | SYSTOLIC BLOOD PRESSURE: 110 MMHG

## 2020-02-19 VITALS — DIASTOLIC BLOOD PRESSURE: 72 MMHG | SYSTOLIC BLOOD PRESSURE: 98 MMHG

## 2020-02-19 PROCEDURE — 93000 ELECTROCARDIOGRAM COMPLETE: CPT

## 2020-02-19 PROCEDURE — 99244 OFF/OP CNSLTJ NEW/EST MOD 40: CPT

## 2020-02-19 PROCEDURE — 93306 TTE W/DOPPLER COMPLETE: CPT

## 2020-02-19 NOTE — HISTORY OF PRESENT ILLNESS
[FreeTextEntry1] : 24-year-old female referred for evaluation of persistent lightheadedness over the past several days.  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  She states that she noted when she turns her head to the left that she became acutely lightheaded which happened on 2 separate occasions and then she remembers washing her hands at the sink and passing out.  There was evidence of head trauma.  No prodrome and no postictal confusion.  Patient continues to complain of lightheadedness especially when standing or ambulating.  She denies any chest pain, shortness of breath or palpitations.  She was noted to be fairly hypotensive while in her primary care physician's office several days later.  Patient states she is eating and drinking normally.  No prior medical history otherwise.  Her blood pressures generally run in the 110s to 120s.  No significant cardiac family history.  Nondrinker non-smoker.

## 2020-02-19 NOTE — PHYSICAL EXAM
[General Appearance - Well Developed] : well developed [Normal Appearance] : normal appearance [General Appearance - Well Nourished] : well nourished [Well Groomed] : well groomed [No Deformities] : no deformities [General Appearance - In No Acute Distress] : no acute distress [Normal Conjunctiva] : the conjunctiva exhibited no abnormalities [Eyelids - No Xanthelasma] : the eyelids demonstrated no xanthelasmas [Normal Oral Mucosa] : normal oral mucosa [No Oral Pallor] : no oral pallor [No Oral Cyanosis] : no oral cyanosis [Heart Rate And Rhythm] : heart rate and rhythm were normal [Murmurs] : no murmurs present [Heart Sounds] : normal S1 and S2 [Exaggerated Use Of Accessory Muscles For Inspiration] : no accessory muscle use [Respiration, Rhythm And Depth] : normal respiratory rhythm and effort [Auscultation Breath Sounds / Voice Sounds] : lungs were clear to auscultation bilaterally [Abdomen Soft] : soft [Abdomen Tenderness] : non-tender [Abnormal Walk] : normal gait [Abdomen Mass (___ Cm)] : no abdominal mass palpated [Nail Clubbing] : no clubbing of the fingernails [Gait - Sufficient For Exercise Testing] : the gait was sufficient for exercise testing [Petechial Hemorrhages (___cm)] : no petechial hemorrhages [Cyanosis, Localized] : no localized cyanosis [] : no rash [Skin Color & Pigmentation] : normal skin color and pigmentation [No Venous Stasis] : no venous stasis [Skin Lesions] : no skin lesions [No Skin Ulcers] : no skin ulcer [Oriented To Time, Place, And Person] : oriented to person, place, and time [No Xanthoma] : no  xanthoma was observed [Affect] : the affect was normal [Mood] : the mood was normal [No Anxiety] : not feeling anxious [Normal Jugular Venous A Waves Present] : normal jugular venous A waves present [Normal Jugular Venous V Waves Present] : normal jugular venous V waves present [FreeTextEntry1] : Right sided muscular tension

## 2020-02-19 NOTE — DISCUSSION/SUMMARY
[Patient] : the patient [With PCP] : with ~his/her~ primary care provider [FreeTextEntry1] : 24-year-old female with lightheadedness and neck pain.  Status post syncopal episode (first time episode) with trauma.  Scheduled for echocardiogram to evaluate left ventricular function.  Seen by neurology and had carotid study done.  Evidence of orthostasis present may be etiology for her syncopal episode; advised increase p.o. fluids as tolerated.  No other need for diagnostic evaluation at present if echocardiogram shows normal left ventricular function and no significant valvular heart disease.  May benefit from short course of NSAIDs for her musculoskeletal neck pain.  Please refer back if symptoms worsen or do not resolve.

## 2020-02-21 ENCOUNTER — EMERGENCY (EMERGENCY)
Facility: HOSPITAL | Age: 25
LOS: 1 days | Discharge: HOME | End: 2020-02-21
Attending: EMERGENCY MEDICINE
Payer: COMMERCIAL

## 2020-02-21 VITALS
TEMPERATURE: 99 F | RESPIRATION RATE: 16 BRPM | OXYGEN SATURATION: 98 % | DIASTOLIC BLOOD PRESSURE: 86 MMHG | SYSTOLIC BLOOD PRESSURE: 121 MMHG | HEART RATE: 83 BPM

## 2020-02-21 LAB
ALBUMIN SERPL ELPH-MCNC: 4.3 G/DL — SIGNIFICANT CHANGE UP (ref 3.3–5)
ALP SERPL-CCNC: 62 U/L — SIGNIFICANT CHANGE UP (ref 40–120)
ALT FLD-CCNC: 10 U/L — SIGNIFICANT CHANGE UP (ref 10–45)
ANION GAP SERPL CALC-SCNC: 12 MMOL/L — SIGNIFICANT CHANGE UP (ref 5–17)
AST SERPL-CCNC: 15 U/L — SIGNIFICANT CHANGE UP (ref 10–40)
BILIRUB SERPL-MCNC: 0.5 MG/DL — SIGNIFICANT CHANGE UP (ref 0.2–1.2)
BUN SERPL-MCNC: 14 MG/DL — SIGNIFICANT CHANGE UP (ref 7–23)
CALCIUM SERPL-MCNC: 9.5 MG/DL — SIGNIFICANT CHANGE UP (ref 8.4–10.5)
CHLORIDE SERPL-SCNC: 104 MMOL/L — SIGNIFICANT CHANGE UP (ref 96–108)
CO2 SERPL-SCNC: 21 MMOL/L — LOW (ref 22–31)
CREAT SERPL-MCNC: 0.79 MG/DL — SIGNIFICANT CHANGE UP (ref 0.5–1.3)
GLUCOSE SERPL-MCNC: 93 MG/DL — SIGNIFICANT CHANGE UP (ref 70–99)
HCT VFR BLD CALC: 40.3 % — SIGNIFICANT CHANGE UP (ref 34.5–45)
HGB BLD-MCNC: 13.3 G/DL — SIGNIFICANT CHANGE UP (ref 11.5–15.5)
MCHC RBC-ENTMCNC: 29.3 PG — SIGNIFICANT CHANGE UP (ref 27–34)
MCHC RBC-ENTMCNC: 33 GM/DL — SIGNIFICANT CHANGE UP (ref 32–36)
MCV RBC AUTO: 88.8 FL — SIGNIFICANT CHANGE UP (ref 80–100)
NRBC # BLD: 0 /100 WBCS — SIGNIFICANT CHANGE UP (ref 0–0)
PLATELET # BLD AUTO: 293 K/UL — SIGNIFICANT CHANGE UP (ref 150–400)
POTASSIUM SERPL-MCNC: 4.4 MMOL/L — SIGNIFICANT CHANGE UP (ref 3.5–5.3)
POTASSIUM SERPL-SCNC: 4.4 MMOL/L — SIGNIFICANT CHANGE UP (ref 3.5–5.3)
PROT SERPL-MCNC: 7.8 G/DL — SIGNIFICANT CHANGE UP (ref 6–8.3)
RBC # BLD: 4.54 M/UL — SIGNIFICANT CHANGE UP (ref 3.8–5.2)
RBC # FLD: 11.3 % — SIGNIFICANT CHANGE UP (ref 10.3–14.5)
SODIUM SERPL-SCNC: 137 MMOL/L — SIGNIFICANT CHANGE UP (ref 135–145)
TROPONIN T, HIGH SENSITIVITY RESULT: <6 NG/L — SIGNIFICANT CHANGE UP (ref 0–51)
WBC # BLD: 7.5 K/UL — SIGNIFICANT CHANGE UP (ref 3.8–10.5)
WBC # FLD AUTO: 7.5 K/UL — SIGNIFICANT CHANGE UP (ref 3.8–10.5)

## 2020-02-21 PROCEDURE — 93010 ELECTROCARDIOGRAM REPORT: CPT

## 2020-02-21 PROCEDURE — 99220: CPT

## 2020-02-21 PROCEDURE — 71046 X-RAY EXAM CHEST 2 VIEWS: CPT | Mod: 26

## 2020-02-21 NOTE — ED CDU PROVIDER INITIAL DAY NOTE - ATTENDING CONTRIBUTION TO CARE
23 y/o F with pmhx of mild asthma and no significant PSHx presents to ED s/p near syncope today at 5PM. Reports that she was shopping at [employer] and felt lightheaded, called out to a [employer] employee who helped break her fall. No head injury. Did not feel agitated, anxious and did not performed extraneous activity prior to syncopal episode. Pt currently endorses lethargy and intermittent dizziness. Pt with syncopal episode last week while at work, states she turned her head side to side and then fainted. Seen at [facility] where she had CT performed which resulted wnml. Pt followed up with cardiologist, echo and carotid duplex performed but pt still awaiting results. Denies HA, vision changes, CP, neck pain, back pain, vomiting, diarrhea, weight changes, dysuria. leg pain.  PMD: Dr. Osborn. Denies tobacco and drug use.  Gen.  no acute distress  HEENT:  perrl eomi  Lungs:  b/l bs  CVS: S1S2   Abd;  soft non tender  Ext: no pitting edema  Neuro: aaox3 no focal deficits  MSK:

## 2020-02-21 NOTE — ED ADULT NURSE NOTE - CHPI ED NUR SYMPTOMS NEG
no chest pain/no chills/no dizziness/no fever/no congestion/no diaphoresis/no nausea/no vomiting/no back pain/no shortness of breath

## 2020-02-21 NOTE — ED PROVIDER NOTE - OBJECTIVE STATEMENT
23 y/o F with pmhx of mild asthma and no significant PSHx presents to ED s/p near syncope today at 5PM. Reports that she was shopping at [employer] and felt lightheaded, called out to a [employer] employee who helped break her fall. No head injury. Did not feel agitated, anxious and did not performed extraneous activity prior to syncopal episode. Pt currently endorses lethargy and intermittent dizziness. Pt with syncopal episode last week while at work, states she turned her head side to side and then fainted. Seen at [facility] where she had CT performed which resulted wnml. Pt followed up with cardiologist, echo and carotid duplex performed but pt still awaiting results. Denies HA, vision changes, CP, neck pain, back pain, vomiting, diarrhea, weight changes, dysuria. leg pain.  PMD: Dr. Osborn. Denies tobacco and drug use.

## 2020-02-21 NOTE — ED ADULT NURSE REASSESSMENT NOTE - SYMPTOMS
dizziness, lightheadedness, fatigue, palpitations with ambulation and standing for long periods of time.

## 2020-02-21 NOTE — ED ADULT NURSE NOTE - CHIEF COMPLAINT QUOTE
syncope episode while at [facility], did not fall or hit head. Last syncopal episode was last thursday.     PMH Asthma, GERD

## 2020-02-21 NOTE — ED ADULT TRIAGE NOTE - CHIEF COMPLAINT QUOTE
syncope episode while at [facility], did not fall or hit head. Last syncopal episode was last thursday.     PMH Asthma, GERD

## 2020-02-21 NOTE — ED ADULT NURSE REASSESSMENT NOTE - ED CARDIAC RATE
Detail Level: Zone
normal/hr: 60's.
normal/hr: 80's.
Plan: Follow up in 8-12 week
Initiate Treatment: Clindamycin gel in the morning daily after washing face \\nTretnoin 0.1% gel pea size amount at night after washing face to nose
Discontinue Regimen: Picking
Continue Regimen: Daily washing with cetaphil twice daily

## 2020-02-21 NOTE — ED ADULT NURSE REASSESSMENT NOTE - NS ED NURSE REASSESS COMMENT FT1
Received pt from JAZMÍN Amado , received pt alert and responsive, oriented x4, denies any respiratory distress, SOB, or difficulty breathing. Pt transferred to CDU for near syncopal episode. Placed in cdu for telemetry monitoring. Pt appears to be in no acute distress. Has no c/o pain. NSR on telemetry hr: 80's. IV in place, patent and free of signs of infiltration,  pt denies chest pain or palpitations, V/S stable, pt afebrile, pt denies pain at this time. Pt educated on unit and unit rules, instructed patient to notify RN of any needed assistance, Pt verbalizes understanding, Call bell placed within reach. Safety maintained. Will continue to monitor.

## 2020-02-21 NOTE — ED ADULT NURSE NOTE - NSIMPLEMENTINTERV_GEN_ALL_ED
Implemented All Fall Risk Interventions:  [place] to call system. Call bell, personal items and telephone within reach. Instruct patient to call for assistance. Room bathroom lighting operational. Non-slip footwear when patient is off stretcher. Physically safe environment: no spills, clutter or unnecessary equipment. Stretcher in lowest position, wheels locked, appropriate side rails in place. Provide visual cue, wrist band, yellow gown, etc. Monitor gait and stability. Monitor for mental status changes and reorient to person, place, and time. Review medications for side effects contributing to fall risk. Reinforce activity limits and safety measures with patient and family.

## 2020-02-21 NOTE — ED CDU PROVIDER INITIAL DAY NOTE - OBJECTIVE STATEMENT
23 y/o F with pmhx of mild asthma and GERD, no significant PSHx, presents to ED s/p near syncope today at 3PM. Reports that she was shopping at [employer] and felt lightheaded, called out to a [employer] employee who helped break her fall. No head injury. Did not feel agitated, anxious and did not performed extraneous activity prior to syncopal episode. Pt currently endorses lethargy and intermittent lightheadedness. Pt with syncopal episode last week while at work, states she turned her head side to side while standing and then fainted. Seen at [facility] where she had CT performed which resulted wnl. Pt followed up with cardiologist, echo performed but pt still awaiting results. also had carotid duplex which was negative and a tilt test that resulted with tachycardia. states she has had R sided neck strain and R sided H/A, but states that didn't occur with episodes of syncope. Denies vision changes, CP, back pain, vomiting, diarrhea, weight changes, dysuria. leg pain, decreased balance, or other complaints.    In ED, labs unremarkable, HCG on 2/13/2020 was negative, CTH on 2/13/2020 was negative. pt sent to CDU for continued telemetry monitoring.      PMD: Dr. Osborn  Cardio Dr. Frantz Miller

## 2020-02-21 NOTE — ED ADULT NURSE REASSESSMENT NOTE - NS ED NURSE REASSESS COMMENT FT1
Received pt from JAZMÍN Amado , received pt alert and responsive, oriented x4, denies any respiratory distress, SOB, or difficulty breathing. Pt transferred to CDU for near syncopal episode. +dizziness, lightheadedness, palpitations and feeling fatigued with ambulation and standing for long periods of time. Pt appears in no acute distress and has no complaints of chest pain/ pressure, SOB, diaphoresis, dizziness, lightheadedness, N/V, F/C. On telemetry pt is NSR hr: 60's. IV in place, patent and free of signs of infiltration, V/S stable, pt afebrile, pt denies pain at this time. Pt educated on unit and unit rules, instructed patient to notify RN of any needed assistance, Pt verbalizes understanding, Call bell placed within reach. Safety maintained. Will continue to monitor. Significant other at bedside.

## 2020-02-21 NOTE — ED ADULT NURSE REASSESSMENT NOTE - COMFORT CARE
plan of care explained/po fluids offered/ambulated to bathroom/darkened lights/repositioned/warm blanket provided

## 2020-02-21 NOTE — ED PROVIDER NOTE - CLINICAL SUMMARY MEDICAL DECISION MAKING FREE TEXT BOX
Episode of near syncope today with recent episode at outside hospital with workup. Concern for cardiac and neuro causes. Will check cardio workup and consider overnight telemetry monitoring.

## 2020-02-21 NOTE — ED ADULT NURSE NOTE - OBJECTIVE STATEMENT
24 year old female presents to ED via walk-in complaining of syncopal episode. PMH of a previous syncopal episode at work on the 13th of February. Endorses being in [employer], on feet for a while at feeling suddenly like she was going to pass out. Denies chest pain SOB 24 year old female presents to ED via walk-in complaining of syncopal episode. PMH of a previous syncopal episode at work on the 13th of February. Endorses being in [employer], on feet for a while and feeling suddenly like she was going to pass out. Did not loose consciousness, no head injury. Denies chest pain SOB prior to episode. Blood glucose 78 at bedside, given apple juice. Placed on bedside cardiac monitor. VS as documented. Denies chest pain, SOB, n/v/d, fever and chills at this time. Bed locked and in lowest position. Appropriate side rails raised for safety. Instructed to call for assistance prior to getting out of bed. Family at bedside. Comfort and safety measures maintained.

## 2020-02-21 NOTE — ED ADULT NURSE NOTE - CAS EDN DISCHARGE INTERVENTIONS
IV discontinued, cath removed intact

## 2020-02-21 NOTE — ED CLERICAL - NS ED CLERK NOTE PRE-ARRIVAL INFORMATION; ADDITIONAL PRE-ARRIVAL INFORMATION
CC/Reason For referral: syncope/near syncope since 2/13  Preferred Consultant(if applicable):  Who admits for you (if needed): Hospitalist service  Do you have documents you would like to fax over? yes  Would you still like to speak to an ED attending? Yes

## 2020-02-21 NOTE — ED PROVIDER NOTE - PROGRESS NOTE DETAILS
ATTG: : I paged Dr. Osborn and covering MD not aware of patient. not able to offer any information / details / record review or recommendations.

## 2020-02-21 NOTE — ED ADULT NURSE REASSESSMENT NOTE - GENERAL PATIENT STATE
improvement verbalized/smiling/interactive/family/SO at bedside/cooperative/resting/sleeping/comfortable appearance

## 2020-02-22 VITALS
HEART RATE: 69 BPM | DIASTOLIC BLOOD PRESSURE: 70 MMHG | RESPIRATION RATE: 16 BRPM | TEMPERATURE: 99 F | OXYGEN SATURATION: 99 % | SYSTOLIC BLOOD PRESSURE: 107 MMHG

## 2020-02-22 PROCEDURE — 71046 X-RAY EXAM CHEST 2 VIEWS: CPT

## 2020-02-22 PROCEDURE — 93005 ELECTROCARDIOGRAM TRACING: CPT

## 2020-02-22 PROCEDURE — 80053 COMPREHEN METABOLIC PANEL: CPT

## 2020-02-22 PROCEDURE — 84484 ASSAY OF TROPONIN QUANT: CPT

## 2020-02-22 PROCEDURE — 99284 EMERGENCY DEPT VISIT MOD MDM: CPT | Mod: 25

## 2020-02-22 PROCEDURE — 82962 GLUCOSE BLOOD TEST: CPT

## 2020-02-22 PROCEDURE — G0378: CPT

## 2020-02-22 PROCEDURE — 85027 COMPLETE CBC AUTOMATED: CPT

## 2020-02-22 PROCEDURE — 99217: CPT

## 2020-02-22 RX ORDER — SODIUM CHLORIDE 9 MG/ML
1000 INJECTION, SOLUTION INTRAVENOUS ONCE
Refills: 0 | Status: COMPLETED | OUTPATIENT
Start: 2020-02-22 | End: 2020-02-22

## 2020-02-22 RX ADMIN — SODIUM CHLORIDE 1000 MILLILITER(S): 9 INJECTION, SOLUTION INTRAVENOUS at 11:24

## 2020-02-22 RX ADMIN — SODIUM CHLORIDE 1000 MILLILITER(S): 9 INJECTION, SOLUTION INTRAVENOUS at 10:16

## 2020-02-22 NOTE — ED CDU PROVIDER DISPOSITION NOTE - PATIENT PORTAL LINK FT
You can access the FollowMyHealth Patient Portal offered by [facility] by registering at the following website: [email or web address]. By joining [employer]’s FollowMyHealth portal, you will also be able to view your health information using other applications (apps) compatible with our system.

## 2020-02-22 NOTE — ED CDU PROVIDER SUBSEQUENT DAY NOTE - HISTORY
No interval changes since initial CDU provider note. Pt feels well without complaint. No active lightheadedness. pt notes she did have some palpitations while walking earlier but no lightheadedness. NAD VSS. no events on tele. will continue monitoring.  - JOSEPH Bowman

## 2020-02-22 NOTE — ED CDU PROVIDER SUBSEQUENT DAY NOTE - PROGRESS NOTE DETAILS
CDU NOTE JOSEPH MIRZA: Pt resting comfortably, feeling well without complaint. NAD, VSS. No events on telemetry. will continue monitoring. Patient seen at bedside. VSS. Patient resting comfortably, no complaints.  States that when she walks she feels off balance with weakness. Patient able to ambulate without assistance to the bathroom, slowly. Spoke with cards fellow, states that he is unsure holter monitors consults are done on the weekend, will call back spectra. Will also reach out to patient's cardiologist this AM. Will reevaluate. - Debbie Moreno PA-C Spoke with cardiology fellow, no holter monitor consults on weekends. Left voicemail for Dr. Frantz Miller [phone] and [phone] to call back spectra. - Debbie Moreno PA-C pt feeling better.  NO: palp, cp, sob, lightheadedness, numbness, weakness.  Pt has not had arrythmia while on monitoring.  Unable to arrange Holter today.  Pt has had ECHO, tilt table, doppler, CT head previously for w/u.  Pt is currently excused from work, but going to school.  Attempting to contact PMD/covering or her Cardiologist - Dr. Frantz Miller or covering to discuss any further ED/CDU management vs outpt. Spoke with cardiologist Dr. Miller states that patient has working diagnosis of atrial tachycardia vs. inappropriate sinus tachycardia. States that patient recommended to hydrate, drink Gatorade, use support stockings, and to get up from a supine position slowly. States that patient should talk off from work and shouldn't be driving at this time. Would be able to arrange for holter monitor outpatient. States that he is able to see patient on Tuesday. Spoke with Dr. Mendieta, on call for patient's PCP states that she can also follow up in their office and to call with any difficultly. Will give 2L IVF and reassess. - Debbie Moreno PA-C Patient seen at bedside. VSS. No acute events on telemetry. Patient resting comfortably, no complaints. 2L IVF given. Patient to follow up with cardiology with recommendations above. DW ED attending. - Debbie Moreno PA-C

## 2020-02-22 NOTE — ED CDU PROVIDER DISPOSITION NOTE - CLINICAL COURSE
25 y/o F with pmhx of mild asthma and GERD, no significant PSHx, presents to ED s/p near syncope today at 3PM. Reports that she was shopping at [employer] and felt lightheaded, called out to a [employer] employee who helped break her fall. No head injury. Did not feel agitated, anxious and did not performed extraneous activity prior to syncopal episode. Pt currently endorses lethargy and intermittent lightheadedness. Pt with syncopal episode last week while at work, states she turned her head side to side while standing and then fainted. Seen at [facility] where she had CT performed which resulted wnl. Pt followed up with cardiologist, echo performed but pt still awaiting results. also had carotid duplex which was negative and a tilt test that resulted with tachycardia. states she has had R sided neck strain and R sided H/A, but states that didn't occur with episodes of syncope. Denies vision changes, CP, back pain, vomiting, diarrhea, weight changes, dysuria. leg pain, decreased balance, or other complaints.    In ED, labs unremarkable, HCG on 2/13/2020 was negative, CTH on 2/13/2020 was negative. pt sent to CDU for continued telemetry monitoring.   In CDU, ___________ 25 y/o F with pmhx of mild asthma and GERD, no significant PSHx, presents to ED s/p near syncope today at 3PM. Reports that she was shopping at [employer] and felt lightheaded, called out to a [employer] employee who helped break her fall. No head injury. Did not feel agitated, anxious and did not performed extraneous activity prior to syncopal episode. Pt currently endorses lethargy and intermittent lightheadedness. Pt with syncopal episode last week while at work, states she turned her head side to side while standing and then fainted. Seen at [facility] where she had CT performed which resulted wnl. Pt followed up with cardiologist, echo performed but pt still awaiting results. also had carotid duplex which was negative and a tilt test that resulted with tachycardia. states she has had R sided neck strain and R sided H/A, but states that didn't occur with episodes of syncope. Denies vision changes, CP, back pain, vomiting, diarrhea, weight changes, dysuria. leg pain, decreased balance, or other complaints.    In ED, labs unremarkable, HCG on 2/13/2020 was negative, CTH on 2/13/2020 was negative. pt sent to CDU for continued telemetry monitoring.   In CDU, patient on tele monitoring. Unable to obtain holter monitor on weekend. Spoke with Dr. Miller, patient's cardiologist. States that patient is known for atrial tachycardia, per prior workup. States that patient can follow up outpatient, but needs to move more slowly from a supine position and needs to stay hydrated. Patient given 2L IVF. Patient ambulatory. DW ED attending, patient to be DC home.

## 2020-02-22 NOTE — ED CDU PROVIDER DISPOSITION NOTE - NSFOLLOWUPINSTRUCTIONS_ED_ALL_ED_FT
1. Drink plenty of fluids to stay hydrated.  2. Continue any home medications as directed.  3. You will need to follow-up with your PMD in the next 2-3 days. Follow up with your cardiologist within 1 week. A copy of your results were given to you to bring to your appointment.  4. Return to ER for lightheaded/dizziness, chest pain, trouble breathing, palpitations, or any other concerns. 1. Drink plenty of fluids to stay hydrated. You can drink Gatorade though out the day. Please also look into obtaining support stocking for your legs. Stand from a seated or laying position slowly. Please do not drive or operate heavy machinery until reevaluated by cardiology.  2. Continue any home medications as directed.  3. You will need to follow-up with your PMD in the next 2-3 days. Follow up with your cardiologist on Tuesday, call to make an appointment. A copy of your results were given to you to bring to your appointment.  4. Return to ER for lightheaded/dizziness, chest pain, trouble breathing, palpitations, or any other concerns.

## 2020-02-23 ENCOUNTER — TRANSCRIPTION ENCOUNTER (OUTPATIENT)
Age: 25
End: 2020-02-23

## 2020-03-04 NOTE — ED ADULT TRIAGE NOTE - CCCP TRG CHIEF CMPLNT
Roanoke


ESPERANZA FITCH DO [NO LOCAL MD] - Follow up as needed syncope

## 2020-03-13 ENCOUNTER — APPOINTMENT (OUTPATIENT)
Dept: ORTHOPEDIC SURGERY | Facility: CLINIC | Age: 25
End: 2020-03-13

## 2020-03-13 PROBLEM — K21.9 GASTRO-ESOPHAGEAL REFLUX DISEASE WITHOUT ESOPHAGITIS: Chronic | Status: ACTIVE | Noted: 2020-02-21

## 2020-03-16 ENCOUNTER — APPOINTMENT (OUTPATIENT)
Dept: PHYSICAL MEDICINE AND REHAB | Facility: CLINIC | Age: 25
End: 2020-03-16

## 2020-03-16 ENCOUNTER — APPOINTMENT (OUTPATIENT)
Dept: ORTHOPEDIC SURGERY | Facility: CLINIC | Age: 25
End: 2020-03-16
Payer: COMMERCIAL

## 2020-03-16 VITALS
DIASTOLIC BLOOD PRESSURE: 75 MMHG | SYSTOLIC BLOOD PRESSURE: 107 MMHG | HEIGHT: 66 IN | HEART RATE: 60 BPM | WEIGHT: 180 LBS | BODY MASS INDEX: 28.93 KG/M2

## 2020-03-16 DIAGNOSIS — M54.2 CERVICALGIA: ICD-10-CM

## 2020-03-16 PROCEDURE — 99214 OFFICE O/P EST MOD 30 MIN: CPT

## 2020-04-26 ENCOUNTER — MESSAGE (OUTPATIENT)
Age: 25
End: 2020-04-26

## 2020-05-06 ENCOUNTER — NON-APPOINTMENT (OUTPATIENT)
Age: 25
End: 2020-05-06

## 2020-05-06 ENCOUNTER — APPOINTMENT (OUTPATIENT)
Dept: CARDIOLOGY | Facility: CLINIC | Age: 25
End: 2020-05-06
Payer: COMMERCIAL

## 2020-05-06 VITALS
HEIGHT: 66 IN | SYSTOLIC BLOOD PRESSURE: 130 MMHG | HEART RATE: 72 BPM | WEIGHT: 182 LBS | BODY MASS INDEX: 29.25 KG/M2 | DIASTOLIC BLOOD PRESSURE: 82 MMHG | RESPIRATION RATE: 15 BRPM

## 2020-05-06 PROCEDURE — 93000 ELECTROCARDIOGRAM COMPLETE: CPT | Mod: 59

## 2020-05-06 PROCEDURE — 99215 OFFICE O/P EST HI 40 MIN: CPT

## 2020-05-06 PROCEDURE — ZZZZZ: CPT

## 2020-05-06 PROCEDURE — 0296T: CPT

## 2020-05-27 ENCOUNTER — MESSAGE (OUTPATIENT)
Age: 25
End: 2020-05-27

## 2020-05-27 ENCOUNTER — APPOINTMENT (OUTPATIENT)
Dept: DISASTER EMERGENCY | Facility: HOSPITAL | Age: 25
End: 2020-05-27

## 2020-05-27 ENCOUNTER — LABORATORY RESULT (OUTPATIENT)
Age: 25
End: 2020-05-27

## 2020-06-01 ENCOUNTER — APPOINTMENT (OUTPATIENT)
Dept: CARDIOLOGY | Facility: CLINIC | Age: 25
End: 2020-06-01
Payer: COMMERCIAL

## 2020-06-01 VITALS — BODY MASS INDEX: 29.57 KG/M2 | WEIGHT: 184 LBS | HEIGHT: 66 IN

## 2020-06-01 DIAGNOSIS — R00.2 PALPITATIONS: ICD-10-CM

## 2020-06-01 PROBLEM — Z00.00 ENCOUNTER FOR PREVENTIVE HEALTH EXAMINATION: Status: ACTIVE | Noted: 2020-06-01

## 2020-06-01 PROCEDURE — 0298T: CPT

## 2020-06-01 PROCEDURE — 99213 OFFICE O/P EST LOW 20 MIN: CPT

## 2020-06-01 NOTE — REASON FOR VISIT
[Follow-Up - Clinic] : a clinic follow-up of [Palpitations] : palpitations [FreeTextEntry1] : murmur

## 2020-06-01 NOTE — DISCUSSION/SUMMARY
[FreeTextEntry1] : This is a 24-year-old female with no significant past medical history, who comes in for cardiac follow up evaluation.\par The patient had a positive tilt table test done February 20, 2020 with Dr. Frantz Miller which demonstrated increase in heart rate from 120-150 beats per minute without a drop in blood pressure.\par \par The patient remains stable from cardiac standpoint.  She had a 2 week event monitor between May 6 of May 20, 2020.  This demonstrated an average heart rate of 80 beats per minute with a minimum heart rate of 46 beats per minute and one isolated episode of the heart of 173 beats per minute at 1:40 PM on May 8, 2020.  The patient had been walking up stairs at that time or doing some sort of exercise.  She had no significant arrhythmia or heart block.\par She has been off her beta blocker now for over 2 weeks without any sequela.\par She will continue increased hydration and use of compression stockings.  She is instructed to follow up with you regarding her medical care will followup with me in 2 months.\par She is currently hemodynamically stable and asymptomatic from a cardiac standpoint.\par The patient may return to work from a cardiac standpoint.  She has normal left ventricular function with an estimated ejection fraction of 55-60%.\par PMH:\par The patient was well until February 13, 2020 when she developed dizziness turning her head sharply to the right, she repeated this action, and felt loss of energy and drop in blood pressure to the point she had a go down to her knees.  She was ultimately evaluated [facility], but was sent home without a Holter monitor since it was a weekend admission.  After that she had a similar event at [facility] when she was shopping where she had a go down to her knees without loss of consciousness.  She was seen by Dr. Miller of electrophysiology who recommended a tilt table test.  The tilt table test was done at [facility].  Definitive results are not available for review. \par  Dr. Miller had then recommended the patient use compression stockings, increase her fluid intake and use Gatorade, and ultimately gave her a prescription for Toprol-XL 25 mg/day after she was noted to have a heart rate of 160 bpm it is unclear if this was noted during the tilt table test or an isolated reading at another visit.\par She was seen in consultation by neurologist and reports that her MRI of the brain, neck and EEG were all within normal limits.\par She has been feeling very well and just took her last dose of Toprol-XL 25 mg/day on May 5,2020.  She wishes to be observed off the medication since it is unclear why this particular event occurred at this time in her life.  She had an uneventful pregnancy and delivery.  She has had no prior episodes of syncope.  At one point she was told she may have POTS.\par \par Electrocardiogram done May 6, 2020 demonstrates normal sinus rhythm rate of 76 bpm is otherwise unremarkable.\par The patient had an echo Doppler examination done February 21, 2020 which demonstrated physiologic tricuspid valve regurgitation and normal left ventricular function.\par The patient has no history of rheumatic fever.  She does not drink excessive caffeine or alcohol.  She has no history of rheumatic fever. I have strongly recommended she drink 1 bottle of Pedialyte per day, continue to wear compression stockings, and contract her calf muscles before standing up from a prolonged sitting or laying down position particularly in the morning or after meals.\par  She will follow-up with her neurologist as needed.\par She is currently hemodynamically stable and asymptomatic from a cardiac standpoint.

## 2020-06-01 NOTE — PHYSICAL EXAM
[General Appearance - Well Developed] : well developed [Normal Appearance] : normal appearance [Well Groomed] : well groomed [General Appearance - Well Nourished] : well nourished [No Deformities] : no deformities [General Appearance - In No Acute Distress] : no acute distress [Eyelids - No Xanthelasma] : the eyelids demonstrated no xanthelasmas [Normal Conjunctiva] : the conjunctiva exhibited no abnormalities [Normal Oropharynx] : normal oropharynx [Normal Oral Mucosa] : normal oral mucosa [Normal Jugular Venous A Waves Present] : normal jugular venous A waves present [Normal Jugular Venous V Waves Present] : normal jugular venous V waves present [No Jugular Venous Vieira A Waves] : no jugular venous vieira A waves [] : no respiratory distress [Respiration, Rhythm And Depth] : normal respiratory rhythm and effort [Exaggerated Use Of Accessory Muscles For Inspiration] : no accessory muscle use [Auscultation Breath Sounds / Voice Sounds] : lungs were clear to auscultation bilaterally [Bowel Sounds] : normal bowel sounds [Abdomen Soft] : soft [Nail Clubbing] : no clubbing of the fingernails [Abnormal Walk] : normal gait [Cyanosis, Localized] : no localized cyanosis [Skin Color & Pigmentation] : normal skin color and pigmentation [No Xanthoma] : no  xanthoma was observed [Skin Turgor] : normal skin turgor [Oriented To Time, Place, And Person] : oriented to person, place, and time [Affect] : the affect was normal [Mood] : the mood was normal [No Anxiety] : not feeling anxious [5th Left ICS - MCL] : palpated at the 5th LICS in the midclavicular line [Normal] : normal [No Precordial Heave] : no precordial heave was noted [Normal Rate] : normal [Rhythm Regular] : regular [Normal S1] : normal S1 [Normal S2] : normal S2 [No Gallop] : no gallop heard [S3] : no S3 [S4] : no S4 [I] : a grade 1 [Right Carotid Bruit] : no bruit heard over the right carotid [Left Carotid Bruit] : no bruit heard over the left carotid [2+] : left 2+ [No Abnormalities] : the abdominal aorta was not enlarged and no bruit was heard [No Pitting Edema] : no pitting edema present

## 2020-06-09 PROBLEM — R00.2 HEART PALPITATIONS: Status: ACTIVE | Noted: 2020-06-09

## 2020-07-22 ENCOUNTER — TRANSCRIPTION ENCOUNTER (OUTPATIENT)
Age: 25
End: 2020-07-22

## 2020-07-26 ENCOUNTER — EMERGENCY (EMERGENCY)
Facility: HOSPITAL | Age: 25
LOS: 1 days | Discharge: HOME | End: 2020-07-26
Attending: STUDENT IN AN ORGANIZED HEALTH CARE EDUCATION/TRAINING PROGRAM
Payer: COMMERCIAL

## 2020-07-26 VITALS
RESPIRATION RATE: 22 BRPM | SYSTOLIC BLOOD PRESSURE: 126 MMHG | HEIGHT: 66 IN | DIASTOLIC BLOOD PRESSURE: 86 MMHG | WEIGHT: 184.97 LBS | OXYGEN SATURATION: 98 % | TEMPERATURE: 98 F | HEART RATE: 101 BPM

## 2020-07-26 VITALS
OXYGEN SATURATION: 98 % | SYSTOLIC BLOOD PRESSURE: 108 MMHG | HEART RATE: 86 BPM | RESPIRATION RATE: 16 BRPM | DIASTOLIC BLOOD PRESSURE: 68 MMHG

## 2020-07-26 PROCEDURE — 99284 EMERGENCY DEPT VISIT MOD MDM: CPT

## 2020-07-26 PROCEDURE — 96374 THER/PROPH/DIAG INJ IV PUSH: CPT

## 2020-07-26 PROCEDURE — 99284 EMERGENCY DEPT VISIT MOD MDM: CPT | Mod: 25

## 2020-07-26 PROCEDURE — 96361 HYDRATE IV INFUSION ADD-ON: CPT

## 2020-07-26 RX ORDER — DEXAMETHASONE 0.5 MG/5ML
10 ELIXIR ORAL ONCE
Refills: 0 | Status: COMPLETED | OUTPATIENT
Start: 2020-07-26 | End: 2020-07-26

## 2020-07-26 RX ORDER — SODIUM CHLORIDE 9 MG/ML
1000 INJECTION, SOLUTION INTRAVENOUS ONCE
Refills: 0 | Status: COMPLETED | OUTPATIENT
Start: 2020-07-26 | End: 2020-07-26

## 2020-07-26 RX ORDER — PROCHLORPERAZINE MALEATE 5 MG
10 TABLET ORAL ONCE
Refills: 0 | Status: COMPLETED | OUTPATIENT
Start: 2020-07-26 | End: 2020-07-26

## 2020-07-26 RX ORDER — ACETAMINOPHEN 500 MG
975 TABLET ORAL ONCE
Refills: 0 | Status: COMPLETED | OUTPATIENT
Start: 2020-07-26 | End: 2020-07-26

## 2020-07-26 RX ADMIN — Medication 10 MILLIGRAM(S): at 17:44

## 2020-07-26 RX ADMIN — Medication 975 MILLIGRAM(S): at 18:10

## 2020-07-26 RX ADMIN — Medication 10 MILLIGRAM(S): at 17:30

## 2020-07-26 RX ADMIN — SODIUM CHLORIDE 1000 MILLILITER(S): 9 INJECTION, SOLUTION INTRAVENOUS at 17:29

## 2020-07-26 RX ADMIN — SODIUM CHLORIDE 1000 MILLILITER(S): 9 INJECTION, SOLUTION INTRAVENOUS at 18:25

## 2020-07-26 RX ADMIN — Medication 975 MILLIGRAM(S): at 17:32

## 2020-07-26 NOTE — ED PROVIDER NOTE - NSFOLLOWUPINSTRUCTIONS_ED_ALL_ED_FT
You were seen an evaluated in the emergency room for headache.    Take 650mg tylenol every 6 hours as needed for pain.  Take 600mg ibuprofen every 6 hours as needed for pain, make sure to take with food.    Drink plenty of fluids to stay hydrated. You can drink sports drinks or pedialyte as they provide electrolyte replacement in addition to fluids.     Please follow up with your neurologist in the next few days.    Return to the Emergency Department immediately if you have slurred speech, difficulty swallowing, change in vision, weakness in arms or legs, worsening headache, or any new and concerning symptoms or concerns.     Please read all attached.

## 2020-07-26 NOTE — ED ADULT NURSE NOTE - OBJECTIVE STATEMENT
Reports headache x 3 days without relief post receiving nerve block. Pt reports pain to right side of head. Denies vision changes, denies nausea, denies dizziness. Pt denies numbness to extremities

## 2020-07-26 NOTE — ED ADULT TRIAGE NOTE - CHIEF COMPLAINT QUOTE
s/p nerve block in c-spine x 3 days ago, pain worse with laying down, (s/p neck injury during syncopal episode in feb 2020), now with R lateral head pain, taking tylenol without relief

## 2020-07-26 NOTE — ED PROVIDER NOTE - OBJECTIVE STATEMENT
25yo woman PMH asthma presents with right-sided throbbing headache x 3 days. Pt states she has been having intermittent headaches and neck pain after a fall from syncope 5 months ago and is followed by Dr. Coon (headache neurologist) and recently had a cervical nerve block which helped with the neck pain but she started to have headache. It started as a normal headache that progressively worsened. She denies focal weakness or numbness in arms or legs, change in vision, slurred speech, fevers or chills, neck stiffness, new rash, photophobia, phonophobia, n/v. No recent trauma.  She took tylenol this morning with minimal relief. 25yo woman PMH asthma presents with right-sided throbbing headache x 3 days. Pt states she has been having intermittent headaches and neck pain after a fall from syncope 5 months ago and is followed by Dr. Coon (headache neurologist) and recently had a cervical nerve block which helped with the neck pain but she started to have headache. It started as a normal headache that progressively worsened. She denies focal weakness or numbness in arms or legs, change in vision, slurred speech, fevers or chills, neck stiffness, new rash, photophobia, phonophobia, n/v. No recent trauma. No OCP.  She took tylenol this morning with minimal relief.

## 2020-07-26 NOTE — ED PROVIDER NOTE - PHYSICAL EXAMINATION
General: young woman in no acute distress  Head: normocephalic, atraumatic  Eyes: PERRL, EOMI  Mouth: moist mucous membranes  Neck: supple neck, full ROM  CV: normal rate and rhythm, peripheral pulses 2+ bilaterally  Respiratory: clear to auscultation bilaterally  Abdomen: soft, nontender, nondistended  : no suprapubic tenderness, no CVAT  MSK: no Cspine tenderness to palpation, no midline spinal tenderness to palpation  Neuro: alert and oriented x3, CN III-XII intact, speech clear, no dysmetria, no pronator drift, strength 5/5 UE and LE bilaterally, sensation equal and intact bilaterally  Skin: no rash or lesions on scalp or head/face

## 2020-07-26 NOTE — ED PROVIDER NOTE - CARE PROVIDER_API CALL
Nader Coon  NEUROLOGY  [address] [place] [address]  Phone: [phone]  Fax: [phone]  Follow Up Time:

## 2020-07-26 NOTE — ED PROVIDER NOTE - ATTENDING CONTRIBUTION TO CARE
ED attending Holli MICHAUD performed a history and physical exam of the patient and discussed their management with the resident/ACP. I reviewed the resident/ACP's note and agree with the documented findings and plan of care except for the following.     24 year old female presented with 3 days of right sided headache. No N/V, neurological deficits, fever, chills, neck pain/ stiffness. Not taking OCP.  Likely migraine headache. Low suspicion for cavernous sinus thrombosis. Plan: Tylenol, compazine, IVF. Reassess

## 2020-07-26 NOTE — ED PROVIDER NOTE - NS ED ROS FT
General: no fever  Head: +headache  Eyes: no vision change  ENT: no nasal discharge/congestion, no sore throat  CV: no chest pain  Resp: no SOB, no cough  GI: no N/V, no abdominal pain  : no dysuria  MSK: no joint pain  Skin: no new rash  Neuro: no focal weakness, no change in sensation

## 2020-07-26 NOTE — ED ADULT NURSE NOTE - NSIMPLEMENTINTERV_GEN_ALL_ED
Implemented All Fall Risk Interventions:  [place] to call system. Call bell, personal items and telephone within reach. Instruct patient to call for assistance. Room bathroom lighting operational. Non-slip footwear when patient is off stretcher. Physically safe environment: no spills, clutter or unnecessary equipment. Stretcher in lowest position, wheels locked, appropriate side rails in place. Provide visual cue, wrist band, yellow gown, etc. Monitor gait and stability. Monitor for mental status changes and reorient to person, place, and time. Review medications for side effects contributing to fall risk. Reinforce activity limits and safety measures with patient and family.

## 2020-07-26 NOTE — ED PROVIDER NOTE - PROGRESS NOTE DETAILS
Adelina Gutierrez, resident MD: pt reports resolution of pain at this time and would like to go home. will discharge patient home. discussed return precautions and need for outpatient follow up.

## 2020-07-26 NOTE — ED PROVIDER NOTE - CLINICAL SUMMARY MEDICAL DECISION MAKING FREE TEXT BOX
23yo woman presents with right sided throbbing and progressively worsening headache x 3 days with no neurologic deficits on exam. Low suspicion for intracranial or infectious etiology. Likely migraine. Will check pregnancy, give pain medication and fluids and reassess.

## 2020-09-14 ENCOUNTER — NON-APPOINTMENT (OUTPATIENT)
Age: 25
End: 2020-09-14

## 2020-09-14 ENCOUNTER — APPOINTMENT (OUTPATIENT)
Dept: CARDIOLOGY | Facility: CLINIC | Age: 25
End: 2020-09-14
Payer: COMMERCIAL

## 2020-09-14 VITALS
SYSTOLIC BLOOD PRESSURE: 110 MMHG | HEIGHT: 66 IN | RESPIRATION RATE: 16 BRPM | HEART RATE: 81 BPM | WEIGHT: 187 LBS | DIASTOLIC BLOOD PRESSURE: 70 MMHG | BODY MASS INDEX: 30.05 KG/M2

## 2020-09-14 DIAGNOSIS — R55 SYNCOPE AND COLLAPSE: ICD-10-CM

## 2020-09-14 PROCEDURE — 93000 ELECTROCARDIOGRAM COMPLETE: CPT

## 2020-09-14 PROCEDURE — 99213 OFFICE O/P EST LOW 20 MIN: CPT

## 2020-09-14 RX ORDER — ALBUTEROL SULFATE 90 UG/1
108 (90 BASE) INHALANT RESPIRATORY (INHALATION)
Qty: 6 | Refills: 0 | Status: ACTIVE | COMMUNITY
Start: 2020-07-22

## 2020-09-14 RX ORDER — METOPROLOL SUCCINATE 25 MG/1
25 TABLET, EXTENDED RELEASE ORAL
Qty: 90 | Refills: 1 | Status: DISCONTINUED | COMMUNITY
Start: 2020-05-13 | End: 2020-09-14

## 2020-09-14 RX ORDER — MELOXICAM 15 MG/1
15 TABLET ORAL
Qty: 30 | Refills: 2 | Status: DISCONTINUED | COMMUNITY
Start: 2019-10-03 | End: 2020-09-14

## 2020-10-28 ENCOUNTER — NON-APPOINTMENT (OUTPATIENT)
Age: 25
End: 2020-10-28

## 2020-11-02 ENCOUNTER — APPOINTMENT (OUTPATIENT)
Dept: CARDIOLOGY | Facility: CLINIC | Age: 25
End: 2020-11-02

## 2020-11-16 ENCOUNTER — NON-APPOINTMENT (OUTPATIENT)
Age: 25
End: 2020-11-16

## 2020-11-16 ENCOUNTER — APPOINTMENT (OUTPATIENT)
Dept: CARDIOLOGY | Facility: CLINIC | Age: 25
End: 2020-11-16
Payer: COMMERCIAL

## 2020-11-16 VITALS
HEART RATE: 76 BPM | SYSTOLIC BLOOD PRESSURE: 122 MMHG | TEMPERATURE: 98 F | BODY MASS INDEX: 29.89 KG/M2 | HEIGHT: 66 IN | RESPIRATION RATE: 16 BRPM | WEIGHT: 186 LBS | DIASTOLIC BLOOD PRESSURE: 78 MMHG

## 2020-11-16 DIAGNOSIS — R00.2 PALPITATIONS: ICD-10-CM

## 2020-11-16 DIAGNOSIS — Z86.79 PERSONAL HISTORY OF OTHER DISEASES OF THE CIRCULATORY SYSTEM: ICD-10-CM

## 2020-11-16 DIAGNOSIS — R01.1 CARDIAC MURMUR, UNSPECIFIED: ICD-10-CM

## 2020-11-16 PROCEDURE — 93000 ELECTROCARDIOGRAM COMPLETE: CPT

## 2020-11-16 PROCEDURE — 99213 OFFICE O/P EST LOW 20 MIN: CPT

## 2020-12-29 ENCOUNTER — OUTPATIENT (OUTPATIENT)
Dept: OUTPATIENT SERVICES | Facility: HOSPITAL | Age: 25
LOS: 1 days | End: 2020-12-29
Payer: COMMERCIAL

## 2020-12-29 DIAGNOSIS — M79.662 PAIN IN LEFT LOWER LEG: ICD-10-CM

## 2020-12-29 PROCEDURE — 73620 X-RAY EXAM OF FOOT: CPT

## 2020-12-29 PROCEDURE — 73610 X-RAY EXAM OF ANKLE: CPT | Mod: 26,LT

## 2020-12-29 PROCEDURE — 93971 EXTREMITY STUDY: CPT

## 2020-12-29 PROCEDURE — 73610 X-RAY EXAM OF ANKLE: CPT

## 2020-12-29 PROCEDURE — 73590 X-RAY EXAM OF LOWER LEG: CPT | Mod: 26,LT

## 2020-12-29 PROCEDURE — 93971 EXTREMITY STUDY: CPT | Mod: 26,LT

## 2020-12-29 PROCEDURE — 73620 X-RAY EXAM OF FOOT: CPT | Mod: 26,LT

## 2020-12-29 PROCEDURE — 73590 X-RAY EXAM OF LOWER LEG: CPT

## 2021-01-19 ENCOUNTER — EMERGENCY (EMERGENCY)
Facility: HOSPITAL | Age: 26
LOS: 1 days | Discharge: HOME | End: 2021-01-19
Payer: COMMERCIAL

## 2021-01-19 VITALS
SYSTOLIC BLOOD PRESSURE: 108 MMHG | HEIGHT: 66 IN | RESPIRATION RATE: 20 BRPM | TEMPERATURE: 98 F | DIASTOLIC BLOOD PRESSURE: 71 MMHG | OXYGEN SATURATION: 98 % | WEIGHT: 179.9 LBS | HEART RATE: 65 BPM

## 2021-01-19 LAB — SARS-COV-2 RNA SPEC QL NAA+PROBE: SIGNIFICANT CHANGE UP

## 2021-01-19 PROCEDURE — 99284 EMERGENCY DEPT VISIT MOD MDM: CPT

## 2021-01-19 PROCEDURE — U0005: CPT

## 2021-01-19 PROCEDURE — 99283 EMERGENCY DEPT VISIT LOW MDM: CPT

## 2021-01-19 PROCEDURE — U0003: CPT

## 2021-01-19 NOTE — ED PROVIDER NOTE - CLINICAL SUMMARY MEDICAL DECISION MAKING FREE TEXT BOX
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 Advised immediate return if worsening symptoms, strict return precautions, especially if short of breath, chest pain, inability to tolerate food/liquid. Pt given pre-printed [facility] Novel Coronavirus (COVID19) information packet which contains instructions on time frame of result and how to obtain. Pt verbalized understanding and agreement of plan.

## 2021-01-19 NOTE — ED PROVIDER NOTE - PATIENT PORTAL LINK FT
You can access the FollowMyHealth Patient Portal offered by [facility] by registering at the following website: [email or web address]. By joining [employer]’s FollowMyHealth portal, you will also be able to view your health information using other applications (apps) compatible with our system.

## 2021-01-19 NOTE — ED PROVIDER NOTE - OBJECTIVE STATEMENT
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 Pt reports eating and drinking normal diet. Normal output. No limit of ADLs. Pt requesting testing at this time.

## 2021-01-19 NOTE — ED PROVIDER NOTE - PHYSICAL EXAMINATION
Vital signs noted, see flowsheet.  General: NAD, well appearing, non-toxic.  HEENT: NC/AT. MMM. Eyes clear b/l  Neck: Supple  Respiratory: No distress  Abdomen: Soft, NTND  Skin: No evidence of rash  Neuro: Awake, alert, oriented Vital signs noted, see flowsheet.  General: NAD, well appearing, non-toxic.  HEENT: NC/AT. MMM. Eyes clear b/l  Neck: Supple  Respiratory: No distress  Skin: No evidence of rash  Neuro: Awake, alert, oriented

## 2021-01-19 NOTE — ED ADULT TRIAGE NOTE - CHIEF COMPLAINT QUOTE
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

## 2021-04-09 NOTE — ED PROVIDER NOTE - CROS ED GI ALL NEG
Initiate Treatment: Ketoconazole shampoo QD. Mometasone scalp solution QD for 2-3 weeks, then PRN.
Detail Level: Zone
Render In Strict Bullet Format?: No
negative...
Initiate Treatment: Clindamycin QD and Benzoyl Peroxide once a week to AA on chest.

## 2022-02-21 NOTE — ED ADULT NURSE REASSESSMENT NOTE - COMFORT CARE
REASON FOR VISIT: Routine post-operative Visit  Operation:  Bilateral breast reduction, right breast 543 g, left breast 634 g  Date of procedure: 1/25/2022 (4 weeks)    INTERVAL EVENTS  Doing well, no complaints. Decreasing in bra size as swelling goes down.    EXAM  Vitals:    02/23/22 1440   BP: 110/68   Pulse: 78   Temp: 98 °F (36.7 °C)      NAD  Nonlabored breathing  Symmetrical breasts  Incision - healing nicely without evidence of drainage, dehiscence, or infection. Scars slightly red  But thin        ASSESSMENT  38 year old female 4 weeks s/p Bilateral breast reduction, right breast 543 g, left breast 634 g .     PLAN  · Activity restrictions: no lifting more than 10 lbs, pushing/pulling x 6 weeks  · Soft compressive sports bra x 3 months  · Scar management: lotion and silicone gel sheets. Scar massage demonstrated today.   · Follow-up in my clinic 6 weeks postop    Julienne Degroot MD MS  Plastic and Reconstructive Surgery        plan of care explained/po fluids offered/ambulated to bathroom/darkened lights/repositioned/warm blanket provided

## 2022-11-29 NOTE — ED PROVIDER NOTE - NS ED ATTENDING STATEMENT MOD
----- Message from Pelon Terry sent at 11/29/2022  2:38 PM EST -----  Contact: Walt Comment [phone]  Patient is requesting a single inhaler of Symbicort to be sent to his local pharmacy to get him through December until his insurance will cover again. Jazz Yates.  Lynda     Thank you
I have personally seen and examined this patient.  I have fully participated in the care of this patient. I have reviewed all pertinent clinical information, including history, physical exam, plan and the Resident’s note and agree except as noted.

## 2023-05-24 NOTE — ED ADULT NURSE NOTE - BOWEL SOUNDS LUQ
present Cephalexin Counseling: I counseled the patient regarding use of cephalexin as an antibiotic for prophylactic and/or therapeutic purposes. Cephalexin (commonly prescribed under brand name Keflex) is a cephalosporin antibiotic which is active against numerous classes of bacteria, including most skin bacteria. Side effects may include nausea, diarrhea, gastrointestinal upset, rash, hives, yeast infections, and in rare cases, hepatitis, kidney disease, seizures, fever, confusion, neurologic symptoms, and others. Patients with severe allergies to penicillin medications are cautioned that there is about a 10% incidence of cross-reactivity with cephalosporins. When possible, patients with penicillin allergies should use alternatives to cephalosporins for antibiotic therapy.

## 2023-10-02 NOTE — ED PROVIDER NOTE - PATIENT PORTAL LINK FT
The defibrillation pads were placed in the posterior/lateral position. You can access the FollowMyHealth Patient Portal offered by [facility] by registering at the following website: [email or web address]. By joining [employer]’s FollowMyHealth portal, you will also be able to view your health information using other applications (apps) compatible with our system.

## 2025-01-07 NOTE — ED PROVIDER NOTE - CPE EDP CARDIAC NORM
[facility] for Tobacco Control website --- [email or web address] --- [email or web address]
normal...